# Patient Record
Sex: MALE | Race: BLACK OR AFRICAN AMERICAN | NOT HISPANIC OR LATINO | URBAN - METROPOLITAN AREA
[De-identification: names, ages, dates, MRNs, and addresses within clinical notes are randomized per-mention and may not be internally consistent; named-entity substitution may affect disease eponyms.]

---

## 2017-09-01 ENCOUNTER — OUTPATIENT (OUTPATIENT)
Dept: OUTPATIENT SERVICES | Facility: HOSPITAL | Age: 62
LOS: 1 days | End: 2017-09-01
Payer: MEDICAID

## 2017-09-09 DIAGNOSIS — R69 ILLNESS, UNSPECIFIED: ICD-10-CM

## 2017-10-01 PROCEDURE — G9001: CPT

## 2019-05-01 ENCOUNTER — OUTPATIENT (OUTPATIENT)
Dept: OUTPATIENT SERVICES | Facility: HOSPITAL | Age: 64
LOS: 1 days | End: 2019-05-01
Payer: MEDICAID

## 2019-05-01 PROCEDURE — G9001: CPT

## 2019-05-02 DIAGNOSIS — Z71.89 OTHER SPECIFIED COUNSELING: ICD-10-CM

## 2019-07-01 ENCOUNTER — EMERGENCY (EMERGENCY)
Facility: HOSPITAL | Age: 64
LOS: 1 days | Discharge: HOME | End: 2019-07-01
Attending: EMERGENCY MEDICINE | Admitting: EMERGENCY MEDICINE
Payer: SELF-PAY

## 2019-07-01 VITALS
RESPIRATION RATE: 18 BRPM | DIASTOLIC BLOOD PRESSURE: 74 MMHG | TEMPERATURE: 99 F | OXYGEN SATURATION: 100 % | HEART RATE: 103 BPM | SYSTOLIC BLOOD PRESSURE: 131 MMHG

## 2019-07-01 DIAGNOSIS — R41.82 ALTERED MENTAL STATUS, UNSPECIFIED: ICD-10-CM

## 2019-07-01 DIAGNOSIS — F10.129 ALCOHOL ABUSE WITH INTOXICATION, UNSPECIFIED: ICD-10-CM

## 2019-07-01 DIAGNOSIS — M54.9 DORSALGIA, UNSPECIFIED: ICD-10-CM

## 2019-07-01 PROCEDURE — 99284 EMERGENCY DEPT VISIT MOD MDM: CPT

## 2019-07-01 RX ORDER — IBUPROFEN 200 MG
600 TABLET ORAL ONCE
Refills: 0 | Status: COMPLETED | OUTPATIENT
Start: 2019-07-01 | End: 2019-07-01

## 2019-07-01 RX ADMIN — Medication 600 MILLIGRAM(S): at 19:35

## 2019-07-01 NOTE — ED ADULT NURSE NOTE - NSIMPLEMENTINTERV_GEN_ALL_ED
Implemented All Universal Safety Interventions:  [place] to call system. Call bell, personal items and telephone within reach. Instruct patient to call for assistance. Room bathroom lighting operational. Non-slip footwear when patient is off stretcher. Physically safe environment: no spills, clutter or unnecessary equipment. Stretcher in lowest position, wheels locked, appropriate side rails in place.

## 2019-07-01 NOTE — ED PROVIDER NOTE - OBJECTIVE STATEMENT
64 male, hx of alcohol abuse, hep c, back/neck pain. BIBEMS for alcohol detox facility since he was not cooperating with their evaluation. Pt states he was drinking yesterday and is tired since he has not slept well due to being out on the street. no fall, trauma. States he has some back pain which is similar to prior episodes of back pain. no numbness, no paresthesias. Also requesting food.

## 2019-07-01 NOTE — ED PROVIDER NOTE - CLINICAL SUMMARY MEDICAL DECISION MAKING FREE TEXT BOX
pt seems intoxicated and disheveled but able to answer questions and follow commands. Walking without assistance. Will give motrin for pain. ESTEFANIA.

## 2020-02-01 ENCOUNTER — OUTPATIENT (OUTPATIENT)
Dept: OUTPATIENT SERVICES | Facility: HOSPITAL | Age: 65
LOS: 1 days | End: 2020-02-01
Payer: MEDICARE

## 2020-02-14 ENCOUNTER — INPATIENT (INPATIENT)
Facility: HOSPITAL | Age: 65
LOS: 11 days | Discharge: HOME | DRG: 435 | End: 2020-02-26
Attending: STUDENT IN AN ORGANIZED HEALTH CARE EDUCATION/TRAINING PROGRAM | Admitting: HOSPITALIST
Payer: MEDICARE

## 2020-02-14 VITALS
OXYGEN SATURATION: 97 % | RESPIRATION RATE: 18 BRPM | SYSTOLIC BLOOD PRESSURE: 129 MMHG | DIASTOLIC BLOOD PRESSURE: 73 MMHG | HEART RATE: 82 BPM | HEIGHT: 67 IN | TEMPERATURE: 99 F | WEIGHT: 153.44 LBS

## 2020-02-14 DIAGNOSIS — Z29.9 ENCOUNTER FOR PROPHYLACTIC MEASURES, UNSPECIFIED: ICD-10-CM

## 2020-02-14 DIAGNOSIS — I48.0 PAROXYSMAL ATRIAL FIBRILLATION: ICD-10-CM

## 2020-02-14 DIAGNOSIS — Z87.828 PERSONAL HISTORY OF OTHER (HEALED) PHYSICAL INJURY AND TRAUMA: Chronic | ICD-10-CM

## 2020-02-14 DIAGNOSIS — D53.1 OTHER MEGALOBLASTIC ANEMIAS, NOT ELSEWHERE CLASSIFIED: ICD-10-CM

## 2020-02-14 DIAGNOSIS — K74.60 UNSPECIFIED CIRRHOSIS OF LIVER: ICD-10-CM

## 2020-02-14 DIAGNOSIS — I10 ESSENTIAL (PRIMARY) HYPERTENSION: ICD-10-CM

## 2020-02-14 DIAGNOSIS — K74.69 OTHER CIRRHOSIS OF LIVER: ICD-10-CM

## 2020-02-14 DIAGNOSIS — Z02.9 ENCOUNTER FOR ADMINISTRATIVE EXAMINATIONS, UNSPECIFIED: ICD-10-CM

## 2020-02-14 DIAGNOSIS — R06.09 OTHER FORMS OF DYSPNEA: ICD-10-CM

## 2020-02-14 DIAGNOSIS — F10.10 ALCOHOL ABUSE, UNCOMPLICATED: ICD-10-CM

## 2020-02-14 DIAGNOSIS — R16.0 HEPATOMEGALY, NOT ELSEWHERE CLASSIFIED: ICD-10-CM

## 2020-02-14 LAB
ALBUMIN SERPL ELPH-MCNC: 3 G/DL — LOW (ref 3.3–5)
ALP SERPL-CCNC: 199 U/L — HIGH (ref 40–120)
ALT FLD-CCNC: 58 U/L — HIGH (ref 10–45)
ANION GAP SERPL CALC-SCNC: 15 MMOL/L — SIGNIFICANT CHANGE UP (ref 5–17)
AST SERPL-CCNC: 110 U/L — HIGH (ref 10–40)
BASOPHILS # BLD AUTO: 0 K/UL — SIGNIFICANT CHANGE UP (ref 0–0.2)
BASOPHILS NFR BLD AUTO: 0 % — SIGNIFICANT CHANGE UP (ref 0–2)
BILIRUB SERPL-MCNC: 6.8 MG/DL — HIGH (ref 0.2–1.2)
BUN SERPL-MCNC: 16 MG/DL — SIGNIFICANT CHANGE UP (ref 7–23)
CALCIUM SERPL-MCNC: 9.3 MG/DL — SIGNIFICANT CHANGE UP (ref 8.4–10.5)
CHLORIDE SERPL-SCNC: 104 MMOL/L — SIGNIFICANT CHANGE UP (ref 96–108)
CO2 SERPL-SCNC: 21 MMOL/L — LOW (ref 22–31)
CREAT SERPL-MCNC: 0.75 MG/DL — SIGNIFICANT CHANGE UP (ref 0.5–1.3)
EOSINOPHIL # BLD AUTO: 0.08 K/UL — SIGNIFICANT CHANGE UP (ref 0–0.5)
EOSINOPHIL NFR BLD AUTO: 0.9 % — SIGNIFICANT CHANGE UP (ref 0–6)
GLUCOSE SERPL-MCNC: 104 MG/DL — HIGH (ref 70–99)
HCT VFR BLD CALC: 33.3 % — LOW (ref 39–50)
HGB BLD-MCNC: 10.6 G/DL — LOW (ref 13–17)
LYMPHOCYTES # BLD AUTO: 1.13 K/UL — SIGNIFICANT CHANGE UP (ref 1–3.3)
LYMPHOCYTES # BLD AUTO: 12.3 % — LOW (ref 13–44)
MAGNESIUM SERPL-MCNC: 1.8 MG/DL — SIGNIFICANT CHANGE UP (ref 1.6–2.6)
MCHC RBC-ENTMCNC: 31.8 GM/DL — LOW (ref 32–36)
MCHC RBC-ENTMCNC: 34.6 PG — HIGH (ref 27–34)
MCV RBC AUTO: 108.8 FL — HIGH (ref 80–100)
MONOCYTES # BLD AUTO: 0.88 K/UL — SIGNIFICANT CHANGE UP (ref 0–0.9)
MONOCYTES NFR BLD AUTO: 9.6 % — SIGNIFICANT CHANGE UP (ref 2–14)
NEUTROPHILS # BLD AUTO: 7.1 K/UL — SIGNIFICANT CHANGE UP (ref 1.8–7.4)
NEUTROPHILS NFR BLD AUTO: 74.6 % — SIGNIFICANT CHANGE UP (ref 43–77)
PHOSPHATE SERPL-MCNC: 2.9 MG/DL — SIGNIFICANT CHANGE UP (ref 2.5–4.5)
PLATELET # BLD AUTO: 278 K/UL — SIGNIFICANT CHANGE UP (ref 150–400)
POTASSIUM SERPL-MCNC: 4.4 MMOL/L — SIGNIFICANT CHANGE UP (ref 3.5–5.3)
POTASSIUM SERPL-SCNC: 4.4 MMOL/L — SIGNIFICANT CHANGE UP (ref 3.5–5.3)
PROT SERPL-MCNC: 8.2 G/DL — SIGNIFICANT CHANGE UP (ref 6–8.3)
RBC # BLD: 3.06 M/UL — LOW (ref 4.2–5.8)
RBC # FLD: 14.6 % — HIGH (ref 10.3–14.5)
SODIUM SERPL-SCNC: 140 MMOL/L — SIGNIFICANT CHANGE UP (ref 135–145)
WBC # BLD: 9.2 K/UL — SIGNIFICANT CHANGE UP (ref 3.8–10.5)
WBC # FLD AUTO: 9.2 K/UL — SIGNIFICANT CHANGE UP (ref 3.8–10.5)

## 2020-02-14 PROCEDURE — 99223 1ST HOSP IP/OBS HIGH 75: CPT | Mod: GC

## 2020-02-14 PROCEDURE — 93010 ELECTROCARDIOGRAM REPORT: CPT

## 2020-02-14 RX ORDER — METOPROLOL TARTRATE 50 MG
25 TABLET ORAL
Refills: 0 | Status: DISCONTINUED | OUTPATIENT
Start: 2020-02-14 | End: 2020-02-26

## 2020-02-14 RX ORDER — LANOLIN ALCOHOL/MO/W.PET/CERES
6 CREAM (GRAM) TOPICAL AT BEDTIME
Refills: 0 | Status: DISCONTINUED | OUTPATIENT
Start: 2020-02-14 | End: 2020-02-26

## 2020-02-14 RX ORDER — METOPROLOL TARTRATE 50 MG
1 TABLET ORAL
Qty: 0 | Refills: 0 | DISCHARGE

## 2020-02-14 RX ORDER — POLYETHYLENE GLYCOL 3350 17 G/17G
17 POWDER, FOR SOLUTION ORAL DAILY
Refills: 0 | Status: DISCONTINUED | OUTPATIENT
Start: 2020-02-14 | End: 2020-02-17

## 2020-02-14 RX ORDER — THIAMINE MONONITRATE (VIT B1) 100 MG
100 TABLET ORAL DAILY
Refills: 0 | Status: DISCONTINUED | OUTPATIENT
Start: 2020-02-14 | End: 2020-02-26

## 2020-02-14 RX ORDER — POLYETHYLENE GLYCOL 3350 17 G/17G
17 POWDER, FOR SOLUTION ORAL
Qty: 0 | Refills: 0 | DISCHARGE

## 2020-02-14 RX ORDER — PANTOPRAZOLE SODIUM 20 MG/1
40 TABLET, DELAYED RELEASE ORAL
Refills: 0 | Status: DISCONTINUED | OUTPATIENT
Start: 2020-02-14 | End: 2020-02-26

## 2020-02-14 RX ORDER — MIRTAZAPINE 45 MG/1
15 TABLET, ORALLY DISINTEGRATING ORAL AT BEDTIME
Refills: 0 | Status: DISCONTINUED | OUTPATIENT
Start: 2020-02-14 | End: 2020-02-26

## 2020-02-14 RX ORDER — FOLIC ACID 0.8 MG
1 TABLET ORAL
Qty: 0 | Refills: 0 | DISCHARGE

## 2020-02-14 RX ORDER — FOLIC ACID 0.8 MG
1 TABLET ORAL DAILY
Refills: 0 | Status: DISCONTINUED | OUTPATIENT
Start: 2020-02-14 | End: 2020-02-26

## 2020-02-14 RX ORDER — ESOMEPRAZOLE MAGNESIUM 40 MG/1
1 CAPSULE, DELAYED RELEASE ORAL
Qty: 0 | Refills: 0 | DISCHARGE

## 2020-02-14 RX ORDER — MIRTAZAPINE 45 MG/1
1 TABLET, ORALLY DISINTEGRATING ORAL
Qty: 0 | Refills: 0 | DISCHARGE

## 2020-02-14 RX ORDER — THIAMINE MONONITRATE (VIT B1) 100 MG
1 TABLET ORAL
Qty: 0 | Refills: 0 | DISCHARGE

## 2020-02-14 RX ADMIN — Medication 6 MILLIGRAM(S): at 23:01

## 2020-02-14 RX ADMIN — MIRTAZAPINE 15 MILLIGRAM(S): 45 TABLET, ORALLY DISINTEGRATING ORAL at 23:02

## 2020-02-14 NOTE — H&P ADULT - NSHPREVIEWOFSYSTEMS_GEN_ALL_CORE
REVIEW OF SYSTEMS:    CONSTITUTIONAL: generalized weakness, no fevers or chills  EYES/ENT: No visual changes;  No vertigo or throat pain   NECK: No pain or stiffness  RESPIRATORY: No cough, wheezing, hemoptysis; No shortness of breath  CARDIOVASCULAR: No chest pain or palpitations, + dyspnea  GASTROINTESTINAL: mild abdominal pain. No nausea, vomiting, or hematemesis; No diarrhea or constipation. No melena or hematochezia.  GENITOURINARY: No dysuria, frequency or hematuria  NEUROLOGICAL: No numbness or weakness  SKIN: No itching, rashes REVIEW OF SYSTEMS:    CONSTITUTIONAL: generalized weakness, no fevers or chills  EYES/ENT: No visual changes;  No vertigo or throat pain   NECK: No pain or stiffness  RESPIRATORY: No cough, wheezing, hemoptysis; No shortness of breath  CARDIOVASCULAR: No chest pain or palpitations, + dyspnea  GASTROINTESTINAL: mild abdominal pain. No nausea, vomiting, or hematemesis; No diarrhea or constipation. No melena or hematochezia.  GENITOURINARY: No dysuria, frequency or hematuria  NEUROLOGICAL: No numbness or weakness  SKIN: No itching, rashes  Psych: No depression No anxiety

## 2020-02-14 NOTE — H&P ADULT - ASSESSMENT
The patient is a 65 year old non-domisciled man with a history if cirrhosis secondary to alcohol and drug abuse, subdural hemorrhage, afib (not on AC) is transferred from [facility] for further work up of hepatic masses.

## 2020-02-14 NOTE — H&P ADULT - PROBLEM SELECTOR PLAN 4
- recorded history of afib. EKG shows NSR.   - call [facility] in AM to discuss history of afib.  - continue metoprolol 25 BID - patient 10.6 with  likely from liver disfunction however has alcohol abuse   - will check b12 folate

## 2020-02-14 NOTE — H&P ADULT - HISTORY OF PRESENT ILLNESS
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 for further work up of hepatic masses. The patient was admitted to [facility] with  diffuse abdominal pain and tremulousness. He had a sepsis work-up which was negative and completed a CIWA taper. He had a repeat CT A/P with IV contrast which redemonstrated mutliple liver lesions. He had an extensive work-up for his cirrhosis and liver lesions in 05/2019 at [facility] and refused biopsy at the time but agreed to biopsy during this hospitalization. They attempted CT guided liver biopsy however the patient was moving around too much and the procedure was aborted. The patient was transferred to [facility] for further hepatology work up.     The patient was seen and examined at bedside. He currently feels weak, and has minimal abdominal pain mainly in the LLQ. He has no nausea no vomiting, no diarrhea, no chest pain. In the past the patietn has had shortness of breath and lower extremity edema but he has not had that since he has been in the hospital. The patient is a 65 year old non-domisciled man with a history if cirrhosis secondary to alcohol and drug abuse, subdural hemorrhage, afib (not on AC) is transferred from [facility] for further work up of hepatic masses. The patient was admitted to [facility] with diffuse abdominal pain and tremulousness. He had a sepsis work-up which was negative and completed a CIWA ativan taper. He had a repeat CT A/P with IV contrast which redemonstrated mutliple liver lesions. He had an extensive work-up for his cirrhosis and liver lesions in 05/2019 at [facility] and refused biopsy at the time but agreed to biopsy during this hospitalization. They attempted CT guided liver biopsy however the patient was moving around too much and the procedure was aborted. The patient was transferred to [facility] for further hepatology work up.     The patient was seen and examined at bedside. He currently feels weak, and has minimal abdominal pain mainly in the LLQ. He has no nausea no vomiting, no diarrhea, no chest pain. In the past the patietn has had shortness of breath and lower extremity edema but he has not had that since he has been in the hospital.

## 2020-02-14 NOTE — H&P ADULT - NSHPLABSRESULTS_GEN_ALL_CORE
10.6   9.20  )-----------( 278      ( 14 Feb 2020 21:43 )             33.3       02-14    140  |  104  |  16  ----------------------------<  104<H>  4.4   |  21<L>  |  0.75    Ca    9.3      14 Feb 2020 21:43  Phos  2.9     02-14  Mg     1.8     02-14    TPro  8.2  /  Alb  3.0<L>  /  TBili  6.8<H>  /  DBili  x   /  AST  110<H>  /  ALT  58<H>  /  AlkPhos  199<H>  02-14    Hepatitis labs from [facility]:   Hep A AB-neg  Hep B Core IgG: pos  Hep C AB : pos    AFP at [facility]: 34.7  CEA at [facility]: 6.52    EKG as reviewed by me: CHRISTINE    CT triple phase at [facility] 2/11: Cirrhotic liver with multiple masses suspicious for multifocal HCC Personally reviewed imaging, labs.    10.6   9.20  )-----------( 278      ( 14 Feb 2020 21:43 )             33.3       02-14    140  |  104  |  16  ----------------------------<  104<H>  4.4   |  21<L>  |  0.75    Ca    9.3      14 Feb 2020 21:43  Phos  2.9     02-14  Mg     1.8     02-14    TPro  8.2  /  Alb  3.0<L>  /  TBili  6.8<H>  /  DBili  x   /  AST  110<H>  /  ALT  58<H>  /  AlkPhos  199<H>  02-14    Hepatitis labs from [facility]:   Hep A AB-neg  Hep B Core IgG: pos  Hep C AB : pos    AFP at [facility]: 34.7  CEA at [facility]: 6.52    EKG as reviewed by me: CHRISTINE    CT triple phase at [facility] 2/11: Cirrhotic liver with multiple masses suspicious for multifocal HCC

## 2020-02-14 NOTE — H&P ADULT - NSHPREVIEWOFSYSTEMS_GEN_ALL_CORE
Gen: no night sweats or change in appetite  Eyes: no changes in vision or diplopia   ENT: no epistaxis, sinus pain, gingival bleeding, odynophagia or dysphagia  CV: no CP, PND or palpitations  Resp: no cough, wheezing, or hemoptysis  GI: no hematemesis, hematochezia, or melena  : no dysuria, polyuria, or hematuria  MSK: no arthralgias or joint swelling   Neuro: no gross sensory changes, numbness, focal deficits  Psych: no depression or changes in concentration  Heme/Onc: no purpura, petechiae or night sweats  Skin: no pruritus, hair loss or skin lesions  All: no photosensitivity, no complaints of anaphylaxis (SOB, throat swelling)

## 2020-02-14 NOTE — H&P ADULT - NSHPSOCIALHISTORY_GEN_ALL_CORE
The patient is homeless, his niece is his next of kin and helps him make medical decisions. He doesn't smoke. He recently stopped using crack, cocaine, and heroine. He used to be a drug dealer. His last drink was before his admission of [facility]. he drinks about a pint of liquor and 5 beers daily. The patient is homeless, his niece is his next of kin and helps him make medical decisions. He doesn't smoke. He recently stopped using crack, cocaine, and heroine. He used to be a drug dealer. His last drink was before his admission of [facility]. he normally drinks about a pint of liquor and 5 beers daily.

## 2020-02-14 NOTE — H&P ADULT - PROBLEM SELECTOR PLAN 3
- patient has history of alcohol abuse last drink 2/5/19 out of window for delirium tremens.   - s/p CIWA taper at [facility].   - monitor for signs of withdrawal. - patient has history of alcohol abuse last drink 2/5/20 out of window for delirium tremens.   -  completed ativan CIWA taper at [facility].   - monitor for signs of withdrawal.  - There is no signs of withdrawal on physical exam at this time. - patient has history of alcohol abuse last drink 2/5/20 out of window for delirium tremens.   -  completed ativan CIWA taper at [facility].   - monitor for signs of withdrawal.  - There is no signs of withdrawal on physical exam at this time.  - cont thiamine, mvt

## 2020-02-14 NOTE — H&P ADULT - PROBLEM SELECTOR PLAN 8
Transitions of Care Status:  1.  Name of PCP:  2.  PCP Contacted on Admission: [ ] Y    [ x] N    3.  PCP contacted at Discharge: [ ] Y    [ ] N    [ ] N/A  4.  Post-Discharge Appointment Date and Location:  5.  Summary of Handoff given to PCP: DVT prophylaxis: lovenox (SDH was year ago, CT from [facility] from january is stable).    Social work consult  PT consult DVT prophylaxis: lovenox (SDH was year ago, CT from [facility] from January is stable).    Social work consult  PT consult
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
## 2020-02-14 NOTE — H&P ADULT - PROBLEM SELECTOR PLAN 7
DVT prophylaxis: lovenox (SDH was year ago, CT from [facility] from january is stable).    Social work consult  PT consult - patient says he has had dyspnea on exertion in the past with some lower extremity edema that is not present on exam now.   - no history of HF.   - EKG shows no current signs of ischemia.   - f/u TTE

## 2020-02-14 NOTE — H&P ADULT - PROBLEM SELECTOR PLAN 1
- The patient has a history of cirrhosis without current encephalopathy. recently found to have rising bilirubin at [facility].   - extensive work-up was done at [facility] regarding the etiology. likely multifactorial in the setting of hep b/c infections as well as alcohol abuse.   - consult hepatology in AM regarding necessity for further work up. - The patient has a history of cirrhosis without current encephalopathy. recently found to have rising bilirubin at [facility].   - extensive work-up was done at [facility] regarding the etiology. likely multifactorial in the setting of hep b/c infections as well as alcohol abuse.   - consult hepatology in AM regarding necessity for further work up.  - Consult IR  - get CEA AFP, hepatology serologies in AM.   - calculate meld in AM - The patient has a history of cirrhosis without current encephalopathy. recently found to have rising bilirubin at [facility].   - extensive work-up was done at [facility] regarding the etiology. likely multifactorial in the setting of hep b/c infections as well as alcohol abuse.   - consult hepatology in AM regarding necessity for further work up.  - Consult IR for Biopsy  - get CEA AFP, hepatology serologies in AM.   - calculate meld in AM  - patient with protein gap likely secondary to liver dysfunction however cannot rule out other malignancy, get LDH in AM and check for bony lesions on imaging. - The patient has a history of cirrhosis without current encephalopathy. recently found to have rising bilirubin at [facility].   - extensive work-up was done at [facility] regarding the etiology. likely multifactorial in the setting of hep b/c infections as well as alcohol abuse.   - consult hepatology in AM regarding poss further work up.  - Consult IR for Biopsy  - get CEA AFP, hepatology serologies in AM.   - calculate meld in AM  - patient with protein gap of unknown etiol

## 2020-02-14 NOTE — H&P ADULT - NSHPPHYSICALEXAM_GEN_ALL_CORE
T(C): 37.2 (02-14-20 @ 18:35), Max: 37.2 (02-14-20 @ 18:35)  HR: 82 (02-14-20 @ 18:35) (82 - 82)  BP: 129/73 (02-14-20 @ 18:35) (129/73 - 129/73)  RR: 18 (02-14-20 @ 18:35) (18 - 18)  SpO2: 97% (02-14-20 @ 18:35) (97% - 97%)    Gen: (fe)male in NAD, appears comfortable, no diaphoresis  HEENT: NCAT, MMM, neck soft and supple  CV: +S1/S2, no m/r/g  Resp: CTAB, no w/r/r  GI: normoactive BS, soft, NTND, no rebounding/guarding  Ext: No LE edema, extremities appear warm and well perfused   Neuro: AOx3, no focal deficits, CNII-XII grossly intact  Psych: No SI/HI/AVH, appropriate affect  Skin: no petechiae, ecchymosis or maculopapular rash noted

## 2020-02-14 NOTE — H&P ADULT - PROBLEM SELECTOR PLAN 2
- patient found to have multiple liver masses this past May with concern for metastatic disease vs. Multifocal HCC.   - patient has history of Hepatitis b and C based on documentation in [facility].   - attempted biopsy at [facility] was aborted. consider attempting again. - patient found to have multiple liver masses this past May with concern for metastatic disease vs. Multifocal HCC.   - patient has history of Hepatitis b and C based on documentation in [facility].   - attempted biopsy at [facility] was aborted. would call IR in AM

## 2020-02-14 NOTE — H&P ADULT - PROBLEM SELECTOR PLAN 6
- patient says he has had dyspnea on exertion in the past with some lower extremity edema that is not present on exam now.   - no history of HF.   - EKG shows no current signs of ischemia.   - f/u TTE - patient has history of HTN on atenolol  - monitor BP consider switching to different medication for BP management given that atenolol is not first line for HTN Tx

## 2020-02-14 NOTE — H&P ADULT - NSICDXPASTMEDICALHX_GEN_ALL_CORE_FT
PAST MEDICAL HISTORY:  Alcohol abuse     Drug abuse heroine, crack, and cocaine, stopped several months ago    Hypertension     Liver cirrhosis     Paroxysmal atrial fibrillation

## 2020-02-14 NOTE — H&P ADULT - PROBLEM SELECTOR PLAN 5
- patient has history of HTN on atenolol  - monitor BP consider switching to different medication for BP management given that atenolol is not first line for HTN Tx - recorded history of afib. EKG shows NSR.   - call [facility] in AM to discuss history of afib.  - continue metoprolol 25 BID

## 2020-02-15 LAB
AFP-TM SERPL-MCNC: 36.8 NG/ML — HIGH
ALBUMIN SERPL ELPH-MCNC: 2.9 G/DL — LOW (ref 3.3–5)
ALP SERPL-CCNC: 189 U/L — HIGH (ref 40–120)
ALT FLD-CCNC: 56 U/L — HIGH (ref 10–45)
AMMONIA BLD-MCNC: 45 UMOL/L — SIGNIFICANT CHANGE UP (ref 11–55)
ANION GAP SERPL CALC-SCNC: 14 MMOL/L — SIGNIFICANT CHANGE UP (ref 5–17)
APTT BLD: 34.1 SEC — SIGNIFICANT CHANGE UP (ref 27.5–36.3)
AST SERPL-CCNC: 103 U/L — HIGH (ref 10–40)
BASOPHILS # BLD AUTO: 0.04 K/UL — SIGNIFICANT CHANGE UP (ref 0–0.2)
BASOPHILS NFR BLD AUTO: 0.5 % — SIGNIFICANT CHANGE UP (ref 0–2)
BILIRUB DIRECT SERPL-MCNC: 3.5 MG/DL — HIGH (ref 0–0.2)
BILIRUB SERPL-MCNC: 6.6 MG/DL — HIGH (ref 0.2–1.2)
BUN SERPL-MCNC: 17 MG/DL — SIGNIFICANT CHANGE UP (ref 7–23)
CALCIUM SERPL-MCNC: 9.1 MG/DL — SIGNIFICANT CHANGE UP (ref 8.4–10.5)
CEA SERPL-MCNC: 6.4 NG/ML — HIGH (ref 0–3.8)
CHLORIDE SERPL-SCNC: 102 MMOL/L — SIGNIFICANT CHANGE UP (ref 96–108)
CHOLEST SERPL-MCNC: 136 MG/DL — SIGNIFICANT CHANGE UP (ref 10–199)
CO2 SERPL-SCNC: 21 MMOL/L — LOW (ref 22–31)
CREAT SERPL-MCNC: 0.68 MG/DL — SIGNIFICANT CHANGE UP (ref 0.5–1.3)
EOSINOPHIL # BLD AUTO: 0.17 K/UL — SIGNIFICANT CHANGE UP (ref 0–0.5)
EOSINOPHIL NFR BLD AUTO: 1.9 % — SIGNIFICANT CHANGE UP (ref 0–6)
FERRITIN SERPL-MCNC: 454 NG/ML — HIGH (ref 30–400)
FOLATE SERPL-MCNC: 12.5 NG/ML — SIGNIFICANT CHANGE UP
GLUCOSE SERPL-MCNC: 105 MG/DL — HIGH (ref 70–99)
HBV CORE AB SER-ACNC: SIGNIFICANT CHANGE UP
HBV SURFACE AB SER-ACNC: REACTIVE
HBV SURFACE AG SER-ACNC: SIGNIFICANT CHANGE UP
HCT VFR BLD CALC: 32.1 % — LOW (ref 39–50)
HCV AB S/CO SERPL IA: 11.45 S/CO — HIGH (ref 0–0.99)
HCV AB S/CO SERPL IA: 12 S/CO — HIGH (ref 0–0.99)
HCV AB SERPL-IMP: REACTIVE
HCV AB SERPL-IMP: REACTIVE
HDLC SERPL-MCNC: 15 MG/DL — LOW
HGB BLD-MCNC: 10.3 G/DL — LOW (ref 13–17)
HIV 1+2 AB+HIV1 P24 AG SERPL QL IA: SIGNIFICANT CHANGE UP
IMM GRANULOCYTES NFR BLD AUTO: 0.5 % — SIGNIFICANT CHANGE UP (ref 0–1.5)
INR BLD: 1.5 RATIO — HIGH (ref 0.88–1.16)
IRON SATN MFR SERPL: 35 % — SIGNIFICANT CHANGE UP (ref 16–55)
IRON SATN MFR SERPL: 63 UG/DL — SIGNIFICANT CHANGE UP (ref 45–165)
LDH SERPL L TO P-CCNC: 140 U/L — SIGNIFICANT CHANGE UP (ref 50–242)
LIPID PNL WITH DIRECT LDL SERPL: 97 MG/DL — SIGNIFICANT CHANGE UP
LYMPHOCYTES # BLD AUTO: 1.09 K/UL — SIGNIFICANT CHANGE UP (ref 1–3.3)
LYMPHOCYTES # BLD AUTO: 12.3 % — LOW (ref 13–44)
MAGNESIUM SERPL-MCNC: 1.7 MG/DL — SIGNIFICANT CHANGE UP (ref 1.6–2.6)
MCHC RBC-ENTMCNC: 32.1 GM/DL — SIGNIFICANT CHANGE UP (ref 32–36)
MCHC RBC-ENTMCNC: 34.6 PG — HIGH (ref 27–34)
MCV RBC AUTO: 107.7 FL — HIGH (ref 80–100)
MONOCYTES # BLD AUTO: 0.8 K/UL — SIGNIFICANT CHANGE UP (ref 0–0.9)
MONOCYTES NFR BLD AUTO: 9 % — SIGNIFICANT CHANGE UP (ref 2–14)
NEUTROPHILS # BLD AUTO: 6.71 K/UL — SIGNIFICANT CHANGE UP (ref 1.8–7.4)
NEUTROPHILS NFR BLD AUTO: 75.8 % — SIGNIFICANT CHANGE UP (ref 43–77)
NRBC # BLD: 0 /100 WBCS — SIGNIFICANT CHANGE UP (ref 0–0)
PHOSPHATE SERPL-MCNC: 2.9 MG/DL — SIGNIFICANT CHANGE UP (ref 2.5–4.5)
PLATELET # BLD AUTO: 308 K/UL — SIGNIFICANT CHANGE UP (ref 150–400)
POTASSIUM SERPL-MCNC: 3.7 MMOL/L — SIGNIFICANT CHANGE UP (ref 3.5–5.3)
POTASSIUM SERPL-SCNC: 3.7 MMOL/L — SIGNIFICANT CHANGE UP (ref 3.5–5.3)
PROT SERPL-MCNC: 8 G/DL — SIGNIFICANT CHANGE UP (ref 6–8.3)
PROTHROM AB SERPL-ACNC: 17.3 SEC — HIGH (ref 10–13.1)
RBC # BLD: 2.98 M/UL — LOW (ref 4.2–5.8)
RBC # FLD: 14.6 % — HIGH (ref 10.3–14.5)
SODIUM SERPL-SCNC: 137 MMOL/L — SIGNIFICANT CHANGE UP (ref 135–145)
TIBC SERPL-MCNC: 179 UG/DL — LOW (ref 220–430)
TOTAL CHOLESTEROL/HDL RATIO MEASUREMENT: 9 RATIO — SIGNIFICANT CHANGE UP (ref 3.4–9.6)
TRIGL SERPL-MCNC: 124 MG/DL — SIGNIFICANT CHANGE UP (ref 10–149)
TSH SERPL-MCNC: 1.05 UIU/ML — SIGNIFICANT CHANGE UP (ref 0.27–4.2)
UIBC SERPL-MCNC: 117 UG/DL — SIGNIFICANT CHANGE UP (ref 110–370)
VIT B12 SERPL-MCNC: 802 PG/ML — SIGNIFICANT CHANGE UP (ref 232–1245)
WBC # BLD: 8.85 K/UL — SIGNIFICANT CHANGE UP (ref 3.8–10.5)
WBC # FLD AUTO: 8.85 K/UL — SIGNIFICANT CHANGE UP (ref 3.8–10.5)

## 2020-02-15 PROCEDURE — 99233 SBSQ HOSP IP/OBS HIGH 50: CPT

## 2020-02-15 RX ORDER — ENOXAPARIN SODIUM 100 MG/ML
40 INJECTION SUBCUTANEOUS DAILY
Refills: 0 | Status: DISCONTINUED | OUTPATIENT
Start: 2020-02-15 | End: 2020-02-26

## 2020-02-15 RX ADMIN — PANTOPRAZOLE SODIUM 40 MILLIGRAM(S): 20 TABLET, DELAYED RELEASE ORAL at 05:19

## 2020-02-15 RX ADMIN — Medication 25 MILLIGRAM(S): at 17:26

## 2020-02-15 RX ADMIN — Medication 100 MILLIGRAM(S): at 13:47

## 2020-02-15 RX ADMIN — Medication 25 MILLIGRAM(S): at 05:19

## 2020-02-15 RX ADMIN — Medication 1 TABLET(S): at 13:47

## 2020-02-15 RX ADMIN — Medication 1 MILLIGRAM(S): at 13:47

## 2020-02-15 RX ADMIN — Medication 6 MILLIGRAM(S): at 21:33

## 2020-02-15 RX ADMIN — MIRTAZAPINE 15 MILLIGRAM(S): 45 TABLET, ORALLY DISINTEGRATING ORAL at 21:33

## 2020-02-15 RX ADMIN — ENOXAPARIN SODIUM 40 MILLIGRAM(S): 100 INJECTION SUBCUTANEOUS at 13:47

## 2020-02-15 NOTE — PROGRESS NOTE ADULT - PROBLEM SELECTOR PLAN 3
- patient has history of alcohol abuse last drink 2/5/20 out of window for delirium tremens.   -  completed ativan CIWA taper at [facility].   - cont thiamine, mvt

## 2020-02-15 NOTE — PROGRESS NOTE ADULT - PROBLEM SELECTOR PLAN 5
- recorded history of afib. EKG shows NSR.   - not on AC due to SDH in the past  - continue metoprolol 25 BID

## 2020-02-15 NOTE — CONSULT NOTE ADULT - ASSESSMENT
65 year old man with medical history of A fib not on anticoagulation, h/o subdural hematoma, Alcohol abuse , Cirrhosis, hepatitis C presented to [facility] as a transfer from [facility] for further management of his liver mass.  Records reviewed.   On 7/2014 CT showed 1st vertebral body compression fracture. CT abdomen: hypodense lesion 6.5 cm in right hepatic lobe.   07/2014 Hep C serology positive   07.2014 AFP: 23. CEA: 6,18    05/2019:  CT abdomen: Right hepatic lobe lesion 5.3 x 5.7 x 6.1 cm and multiple other lesions. Lymphadenopathy  05/2019 Colonoscopy and EGD: colonic diverticulosis. No varices reported.    02/2020 Admitted to [facility] for abdominal pain. CT abdomen : numerous hepatic masses. Largest 5.5x 10.9 right lobe.   Ethanol level: 236. CT guided biopsy planned on 02/07 but failed due to technical difficulty.   Urine tox positive for cocaine   CT abdomen 2/11 triple phase: cirrhotic liver with multiple masses suspicious for hepatocellular carcinoma  AFP: 34.7   CEA: 6.52  Hepatitis B core IgG and Hepatitis C Ab are reactive.    Impression:  # Elevated liver enzymes : likely due to Alcoholic hepatitis ( DF score: 26), in the setting of cirrhotic liver and possible HCC.  # Liver lesions and elevated AFP suspicious for HCC  # Hep C vs Alcohol induced_Cirrhosis, MELD-Na _ 20 ( 02/15/2020)        - varices: EGD 05/2019 EGD report is not available - but no documentation of varices in the outside hospital.         - ascites: yes on exam        - SPE: not on exam        - HCC: multiple lesions suspicious for HCC    # Alcohol and cocaine abuse   # A fib    Recommendations:  - Pleas have the CT scan films uploaded and interpret by our radiology     - Repeat AFP  - trend CBC, INR, CMP   - Obtain hepatitis C PCR- RNA, Hepatitis B PCR- DNA  - obtain HBsAb, HBsAg, HBcAb, HCV Ab, HAV IgG/IgM to r/o viral hepatitides as cause of cirrhosis  - obtain CHRISTIE, ASMA, LKM Ab, quant IgM, IgA, Ig G AMA to r/o autoimmune disease  - obtain alpha-1-antityrypsin, iron panel and ferritin, ceruloplasmin  - avoid all hepatotoxic agents  - encourage EtOH cessation  - Nutrition support 65 year old man with medical history of A fib not on anticoagulation, h/o subdural hematoma, Alcohol abuse , Cirrhosis, hepatitis C presented to [facility] as a transfer from [facility] for further management of his liver mass.  Records reviewed.   On 7/2014 CT showed 1st vertebral body compression fracture. CT abdomen: hypodense lesion 6.5 cm in right hepatic lobe.   07/2014 Hep C serology positive   07.2014 AFP: 23. CEA: 6,18    05/2019:  CT abdomen: Right hepatic lobe lesion 5.3 x 5.7 x 6.1 cm and multiple other lesions. Lymphadenopathy  05/2019 Colonoscopy and EGD: colonic diverticulosis. No varices reported.    02/2020 Admitted to [facility] for abdominal pain. CT abdomen : numerous hepatic masses. Largest 5.5x 10.9 right lobe.   Ethanol level: 236. CT guided biopsy planned on 02/07 but failed due to technical difficulty.   Urine tox positive for cocaine   CT abdomen 2/11 triple phase: cirrhotic liver with multiple masses suspicious for hepatocellular carcinoma  AFP: 34.7   CEA: 6.52  Hepatitis B core IgG and Hepatitis C Ab are reactive.    Impression:  # Elevated liver enzymes : likely due to Alcoholic hepatitis ( DF score: 26), in the setting of cirrhotic liver and possible HCC.  # Liver lesions and elevated AFP suspicious for HCC  # Hep C vs Alcohol induced_Cirrhosis, MELD-Na _ 20 ( 02/15/2020)        - varices: EGD 05/2019 EGD report is not available - but no documentation of varices in the outside hospital.         - ascites: yes on exam        - SPE: not on exam        - HCC: multiple lesions suspicious for HCC    # Alcohol and cocaine abuse   # A fib    Recommendations:  - Pleas have the CT scan films uploaded and interpreted by our radiology     - trend CBC, INR, CMP   - Obtain hepatitis C PCR- RNA, Hepatitis B PCR- DNA  - obtain HBsAb, HBsAg, HBcAb, HCV Ab, HAV IgG/IgM to r/o viral hepatitides as cause of cirrhosis  - obtain CHRISTIE, ASMA, LKM Ab, quant IgM, IgA, Ig G AMA to r/o autoimmune disease  - obtain alpha-1-antityrypsin, iron panel and ferritin, ceruloplasmin  - avoid all hepatotoxic agents  - encourage EtOH cessation  - Nutrition support

## 2020-02-15 NOTE — PHYSICAL THERAPY INITIAL EVALUATION ADULT - GENERAL OBSERVATIONS, REHAB EVAL
Pt received Pt received supine in bed in NAD, blanket over head, +IVL, VSS, uncooperative not willing to participate in OOB activity at this time.

## 2020-02-15 NOTE — PHYSICAL THERAPY INITIAL EVALUATION ADULT - DIAGNOSIS, PT EVAL
Decreased strength, endurance, balance, cognition all impacting ability to perform ADLs and functional mobility.
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
## 2020-02-15 NOTE — PHYSICAL THERAPY INITIAL EVALUATION ADULT - DISCHARGE DISPOSITION, PT EVAL
TBD pending completion of functional evaluation and further hospital course Pt stated he has no place to go, Pt will appropriate placement w/ assistance and require PT to improve strength, balance and to maximize his functional independence./home w/ assist Pt stated he has no place to go, Pt will require appropriate placement w/ assistance (homeless shelter Vs rehab) and will benefit from PT to improve his strength, balance and to maximize his functional independence./home w/ assist
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
## 2020-02-15 NOTE — PROGRESS NOTE ADULT - PROBLEM SELECTOR PLAN 2
- patient found to have multiple liver masses this past May with concern for metastatic disease vs. Multifocal HCC.   - AFP 36.8  -CEA 6.4  - hepatology consult pending

## 2020-02-15 NOTE — PHYSICAL THERAPY INITIAL EVALUATION ADULT - PERTINENT HX OF CURRENT PROBLEM, REHAB EVAL
64 yo M with PMHx of A fib not on anticoagulation, h/o subdural hematoma, alcohol abuse, Cirrhosis, hepatitis C presented to [facility] as a transfer from [facility] for further management of his hepatic mass. SEE BELOW:

## 2020-02-15 NOTE — PHYSICAL THERAPY INITIAL EVALUATION ADULT - BALANCE TRAINING, PT EVAL
GOAL: Pt will improve static/dynamic sitting and standing balance by at least 1/2 grade to decrease fall risk during functional mobility and promote independence with ADLs within 3-4 wks.

## 2020-02-15 NOTE — PROGRESS NOTE ADULT - PROBLEM SELECTOR PLAN 8
DVT prophylaxis: lovenox (SDH was year ago, CT from [facility] from January is stable).    Social work consult  PT consult

## 2020-02-15 NOTE — PHYSICAL THERAPY INITIAL EVALUATION ADULT - STRENGTHENING, PT EVAL
GOAL: Pt will increase strength x4 extremities to improve ease with functional mobility within 3-4 wks.

## 2020-02-15 NOTE — PROGRESS NOTE ADULT - PROBLEM SELECTOR PLAN 1
- The patient has a history of cirrhosis without current encephalopathy. recently found to have rising bilirubin at [facility].   - extensive work-up was done at [facility] regarding the etiology. likely multifactorial in the setting of hep b/c infections as well as alcohol abuse.   - hepatology consult pending

## 2020-02-15 NOTE — PHYSICAL THERAPY INITIAL EVALUATION ADULT - LEVEL OF CONSCIOUSNESS, REHAB EVAL
agitated/alert/Pt reports feeling fatigued, uncooperative, not willing to perform OOB activity at this time due to lethargy and weakness.

## 2020-02-15 NOTE — PROGRESS NOTE ADULT - PROBLEM SELECTOR PLAN 7
- patient says he has had dyspnea on exertion in the past with some lower extremity edema that is not present on exam now.   - no history of HF.   - EKG shows no current signs of ischemia.   - f/u TTE

## 2020-02-15 NOTE — PROGRESS NOTE ADULT - PROBLEM SELECTOR PLAN 4
- patient 10.6 with  likely from liver disfunction however has alcohol abuse   - folate and b12 normal

## 2020-02-15 NOTE — PHYSICAL THERAPY INITIAL EVALUATION ADULT - PRECAUTIONS/LIMITATIONS, REHAB EVAL
fall precautions/CONTINUED: 02/2020 Admitted to [facility] for abdominal pain. CT abdomen : numerous hepatic masses. Largest 5.5x 10.9 right lobe.

## 2020-02-15 NOTE — PHYSICAL THERAPY INITIAL EVALUATION ADULT - ADDITIONAL COMMENTS
Per patient, resides in private home with spouse, 15 steps to enter +BHR assist, no stairs to negotiate inside the home. Pt states he was ambulating independently prior to admit without AD for household distances with RW for community ambulation. Pt reports performing all ADLs independently, (+) working for a [employer] company, (-) driving. Per Inpatient Documents via patient profile patient is homeless. Per patient, he lived in private home with spouse, 15 steps to enter +BHR assist, no stairs to negotiate inside the home. Pt states he was ambulating independently prior to admit without AD for household distances with RW for community ambulation. Pt reports performing all ADLs independently, (+) working for a [employer] company, (-) driving. Per Inpatient Documents via patient profile patient is homeless.

## 2020-02-15 NOTE — PHYSICAL THERAPY INITIAL EVALUATION ADULT - LEVEL OF INDEPENDENCE: SIT/STAND, REHAB EVAL
Pt adamantly refusing OOB activity at this time, appears agitated reports feeling fatigued and weak reports ambulating this morning to the bathroom stand-by assist/contact guard

## 2020-02-15 NOTE — PHYSICAL THERAPY INITIAL EVALUATION ADULT - THERAPY FREQUENCY, PT EVAL
Attending Note       The Resident's history was reviewed and patient interviewed.    The History is confirmed      Brief history: Pt has notes blood in toilet after urination 3 times today. Blood also noted on underwear. No dysuria, urgency or frequency. Bleeding is not vaginal. Pt also has a hx of hemorrhoids and feels bleeding is from this. No abdominal , rectal or back pain.      The Resident's physical exam was reviewed and patient examined.    Physical confirmed      Brief Physical: No abd discomfort to palpation.      The Assessment and plan were reviewed with the resident.      I confirm the clinical impression.    I have reviewed the residents care plan and agree with the planned approach.      Vitals  Vitals:    08/02/19 1126 08/02/19 1130 08/02/19 1200 08/02/19 1204   BP: 113/61 114/63 134/69 126/71   Pulse: 51 51 54 55   Resp: 19 20 19 19   Temp:       TempSrc:       SpO2: 98% 98% 99% 96%   Weight:       Height:           ED Course  Rectal exam with evidence of recently bleeding hemorrhoid. UA / labs.    [facility]  Critical Care time spent on this patient outside of billable procedures:  None    Discharge  Clinical Impression:  The primary encounter diagnosis was External hemorrhoids. A diagnosis of Acute cystitis with hematuria was also pertinent to this visit.    Follow Up:  [facility]  [facility] your appointment with [facility] on 8/12 for follow-up of your hemorrhoids and rectal bleeding.              Summary of your Discharge Medications      Take these Medications      Details   pramoxine 1 % foam  Commonly known as:  PROCTOFOAM   Place rectally every 2 hours as needed for Hemorrhoids.     sulfamethoxazole-trimethoprim 800-160 MG per tablet  Commonly known as:  BACTRIM DS   Take 1 tablet by mouth 2 times daily for 3 days.            Pt is discharged to home/self care in stable condition.       I have reviewed the information recorded by the scribe for accuracy and agree with its  contents.    ____________________________________________________________________    Imelda Chang acting as a scribe for Dr. Sandor Duque  Dictation # [id number]  Scribe: Imelda Duque MD  08/02/19 6098     2-3x/week

## 2020-02-15 NOTE — CONSULT NOTE ADULT - ATTENDING COMMENTS
Hepatology Staff: Bárbara Moser MD    I saw and examined the patient along with  Dr. Mehta  02-16-20 @ 08:57.    Vitals: Vital Signs Last 24 Hrs  T(C): 36.3 (16 Feb 2020 04:55), Max: 36.8 (15 Feb 2020 21:04)  T(F): 97.4 (16 Feb 2020 04:55), Max: 98.2 (15 Feb 2020 21:04)  HR: 87 (16 Feb 2020 04:55) (75 - 87)  BP: 111/78 (16 Feb 2020 04:55) (108/72 - 147/84)    RR: 18 (16 Feb 2020 04:55) (18 - 18)  SpO2: 96% (16 Feb 2020 04:55) (96% - 97%)    Labs:INR: 1.51 ratio (02-16-20 @ 06:43)  Creatinine, Serum: 0.64 mg/dL (02-16-20 @ 06:43)  Bilirubin Total, Serum: 5.3 mg/dL (02-16-20 @ 06:43)  INR: 1.50 ratio (02-15-20 @ 09:34)      Imaging Studies:  I/O: I&O's Summary    15 Feb 2020 07:01  -  16 Feb 2020 07:00  --------------------------------------------------------  IN: 240 mL / OUT: 0 mL / NET: 240 mL      Nutritional Status:   Albumin, Serum: 2.8 g/dL (02-16-20 @ 06:43)    Recommendations: Mild HE- improving on Lactulose. Add Rifaximine 550 mg PO bid.  Multifocal HCC. We will review CT imaging with radiology for further recommendations. Recommend infections w/u ( blood, urine Cx).    Plan discussed with Primary team. Hepatology Staff: Bárbara Moser MD    I saw and examined the patient along with  Dr. Mehta  02-16-20 @ 08:57.    Vitals: Vital Signs Last 24 Hrs  T(C): 36.3 (16 Feb 2020 04:55), Max: 36.8 (15 Feb 2020 21:04)  T(F): 97.4 (16 Feb 2020 04:55), Max: 98.2 (15 Feb 2020 21:04)  HR: 87 (16 Feb 2020 04:55) (75 - 87)  BP: 111/78 (16 Feb 2020 04:55) (108/72 - 147/84)    RR: 18 (16 Feb 2020 04:55) (18 - 18)  SpO2: 96% (16 Feb 2020 04:55) (96% - 97%)    Labs:INR: 1.51 ratio (02-16-20 @ 06:43)  Creatinine, Serum: 0.64 mg/dL (02-16-20 @ 06:43)  Bilirubin Total, Serum: 5.3 mg/dL (02-16-20 @ 06:43)  INR: 1.50 ratio (02-15-20 @ 09:34)      Imaging Studies:  I/O: I&O's Summary    15 Feb 2020 07:01  -  16 Feb 2020 07:00  --------------------------------------------------------  IN: 240 mL / OUT: 0 mL / NET: 240 mL      Nutritional Status:   Albumin, Serum: 2.8 g/dL (02-16-20 @ 06:43)    Recommendations: Mild HE- much improved on Lactulose. Add Rifaximine 550 mg PO bid.  Multiple liver lesions ? concern for HCC. Patient reports that he had liver lesions for a long time. Outside CT images were reviewed with radiology, and the lesions were not classical for HCC. Hemangiomas remain in the d/d. We will recommend a MRI with and without contrast for further evaluation.  Recommend infectious w/u ( blood, urine Cx) considering admitted with HE.  Plan discussed with Primary team.

## 2020-02-15 NOTE — PROGRESS NOTE ADULT - ASSESSMENT
The patient is a 65 year old non-domisciled man with a history if cirrhosis secondary to alcohol and drug abuse, subdural hemorrhage, afib (not on AC) is transferred from [facility] for further work up of hepatic masses.

## 2020-02-15 NOTE — PHYSICAL THERAPY INITIAL EVALUATION ADULT - TRANSFER TRAINING, PT EVAL
GOAL: Pt will perform all functional transfers independently with least restrictive AD within 3-4 wks.
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
## 2020-02-16 LAB
ALBUMIN SERPL ELPH-MCNC: 2.8 G/DL — LOW (ref 3.3–5)
ALP SERPL-CCNC: 193 U/L — HIGH (ref 40–120)
ALT FLD-CCNC: 56 U/L — HIGH (ref 10–45)
ANION GAP SERPL CALC-SCNC: 13 MMOL/L — SIGNIFICANT CHANGE UP (ref 5–17)
AST SERPL-CCNC: 102 U/L — HIGH (ref 10–40)
BILIRUB SERPL-MCNC: 5.3 MG/DL — HIGH (ref 0.2–1.2)
BUN SERPL-MCNC: 18 MG/DL — SIGNIFICANT CHANGE UP (ref 7–23)
CALCIUM SERPL-MCNC: 9.1 MG/DL — SIGNIFICANT CHANGE UP (ref 8.4–10.5)
CHLORIDE SERPL-SCNC: 103 MMOL/L — SIGNIFICANT CHANGE UP (ref 96–108)
CO2 SERPL-SCNC: 22 MMOL/L — SIGNIFICANT CHANGE UP (ref 22–31)
CREAT SERPL-MCNC: 0.64 MG/DL — SIGNIFICANT CHANGE UP (ref 0.5–1.3)
GLUCOSE SERPL-MCNC: 98 MG/DL — SIGNIFICANT CHANGE UP (ref 70–99)
HCT VFR BLD CALC: 31.9 % — LOW (ref 39–50)
HGB BLD-MCNC: 10.2 G/DL — LOW (ref 13–17)
INR BLD: 1.51 RATIO — HIGH (ref 0.88–1.16)
MCHC RBC-ENTMCNC: 32 GM/DL — SIGNIFICANT CHANGE UP (ref 32–36)
MCHC RBC-ENTMCNC: 34.7 PG — HIGH (ref 27–34)
MCV RBC AUTO: 108.5 FL — HIGH (ref 80–100)
NRBC # BLD: 0 /100 WBCS — SIGNIFICANT CHANGE UP (ref 0–0)
PLATELET # BLD AUTO: 309 K/UL — SIGNIFICANT CHANGE UP (ref 150–400)
POTASSIUM SERPL-MCNC: 4.1 MMOL/L — SIGNIFICANT CHANGE UP (ref 3.5–5.3)
POTASSIUM SERPL-SCNC: 4.1 MMOL/L — SIGNIFICANT CHANGE UP (ref 3.5–5.3)
PROT SERPL-MCNC: 7.9 G/DL — SIGNIFICANT CHANGE UP (ref 6–8.3)
PROTHROM AB SERPL-ACNC: 17.4 SEC — HIGH (ref 10–12.9)
RBC # BLD: 2.94 M/UL — LOW (ref 4.2–5.8)
RBC # FLD: 14.5 % — SIGNIFICANT CHANGE UP (ref 10.3–14.5)
SODIUM SERPL-SCNC: 138 MMOL/L — SIGNIFICANT CHANGE UP (ref 135–145)
WBC # BLD: 9.01 K/UL — SIGNIFICANT CHANGE UP (ref 3.8–10.5)
WBC # FLD AUTO: 9.01 K/UL — SIGNIFICANT CHANGE UP (ref 3.8–10.5)

## 2020-02-16 PROCEDURE — 99233 SBSQ HOSP IP/OBS HIGH 50: CPT

## 2020-02-16 RX ORDER — BENZOCAINE AND MENTHOL 5; 1 G/100ML; G/100ML
1 LIQUID ORAL ONCE
Refills: 0 | Status: COMPLETED | OUTPATIENT
Start: 2020-02-16 | End: 2020-02-16

## 2020-02-16 RX ADMIN — Medication 1 MILLIGRAM(S): at 13:11

## 2020-02-16 RX ADMIN — Medication 1 TABLET(S): at 13:11

## 2020-02-16 RX ADMIN — Medication 100 MILLIGRAM(S): at 13:11

## 2020-02-16 RX ADMIN — BENZOCAINE AND MENTHOL 1 LOZENGE: 5; 1 LIQUID ORAL at 06:02

## 2020-02-16 RX ADMIN — Medication 25 MILLIGRAM(S): at 17:33

## 2020-02-16 RX ADMIN — Medication 25 MILLIGRAM(S): at 05:22

## 2020-02-16 RX ADMIN — MIRTAZAPINE 15 MILLIGRAM(S): 45 TABLET, ORALLY DISINTEGRATING ORAL at 21:38

## 2020-02-16 RX ADMIN — Medication 6 MILLIGRAM(S): at 21:38

## 2020-02-16 RX ADMIN — PANTOPRAZOLE SODIUM 40 MILLIGRAM(S): 20 TABLET, DELAYED RELEASE ORAL at 05:22

## 2020-02-16 NOTE — PROGRESS NOTE ADULT - PROBLEM SELECTOR PLAN 2
- patient found to have multiple liver masses this past May with concern for metastatic disease vs. Multifocal HCC.   - AFP 36.8  -CEA 6.4  - awaiting imaging to be uploaded to our system, f/u hep recs

## 2020-02-16 NOTE — PROGRESS NOTE ADULT - ASSESSMENT
The patient is a 65 year old non-domisciled man with a history if cirrhosis secondary to alcohol and drug abuse, subdural hemorrhage, afib (not on AC) is transferred from [facility] for further work up of hepatic masses.

## 2020-02-16 NOTE — PROGRESS NOTE ADULT - PROBLEM SELECTOR PLAN 3
- patient has history of alcohol abuse last drink 2/5/20 out of window for delirium tremens.   -  completed ativan CIWA taper at [facility].   - cont thiamine, mvt

## 2020-02-16 NOTE — PROGRESS NOTE ADULT - PROBLEM SELECTOR PLAN 1
- The patient has a history of cirrhosis without current encephalopathy. recently found to have rising bilirubin at [facility].   - extensive work-up was done at [facility] regarding the etiology. likely multifactorial in the setting of hep b/c infections as well as alcohol abuse.   - appreciate hepatology c/s, serologies pending  - HEP C reactive Hep S Ab reactive, Hep B surface ag nonreactive, core nonreactive  - pending further serologies- HIV nonreactive

## 2020-02-16 NOTE — PROGRESS NOTE ADULT - PROBLEM SELECTOR PLAN 7
DVT prophylaxis: lovenox (SDH was year ago, CT from [facility] from January is stable).    Social work consult  PT consult
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
## 2020-02-17 DIAGNOSIS — K72.90 HEPATIC FAILURE, UNSPECIFIED WITHOUT COMA: ICD-10-CM

## 2020-02-17 LAB
ALBUMIN SERPL ELPH-MCNC: 2.8 G/DL — LOW (ref 3.3–5)
ALP SERPL-CCNC: 200 U/L — HIGH (ref 40–120)
ALT FLD-CCNC: 67 U/L — HIGH (ref 10–45)
ANION GAP SERPL CALC-SCNC: 15 MMOL/L — SIGNIFICANT CHANGE UP (ref 5–17)
APTT BLD: 35.5 SEC — SIGNIFICANT CHANGE UP (ref 27.5–36.3)
AST SERPL-CCNC: 118 U/L — HIGH (ref 10–40)
BILIRUB SERPL-MCNC: 4.5 MG/DL — HIGH (ref 0.2–1.2)
BUN SERPL-MCNC: 18 MG/DL — SIGNIFICANT CHANGE UP (ref 7–23)
CALCIUM SERPL-MCNC: 9 MG/DL — SIGNIFICANT CHANGE UP (ref 8.4–10.5)
CERULOPLASMIN SERPL-MCNC: 39 MG/DL — HIGH (ref 15–30)
CHLORIDE SERPL-SCNC: 101 MMOL/L — SIGNIFICANT CHANGE UP (ref 96–108)
CO2 SERPL-SCNC: 22 MMOL/L — SIGNIFICANT CHANGE UP (ref 22–31)
CREAT SERPL-MCNC: 0.68 MG/DL — SIGNIFICANT CHANGE UP (ref 0.5–1.3)
FERRITIN SERPL-MCNC: 414 NG/ML — HIGH (ref 30–400)
GLUCOSE SERPL-MCNC: 91 MG/DL — SIGNIFICANT CHANGE UP (ref 70–99)
HAV IGG SER QL IA: REACTIVE
HAV IGG SER QL IA: REACTIVE
HCV RNA FLD QL NAA+PROBE: SIGNIFICANT CHANGE UP
HCV RNA SPEC QL PROBE+SIG AMP: SIGNIFICANT CHANGE UP
IGA FLD-MCNC: 2167 MG/DL — HIGH (ref 84–499)
IGG FLD-MCNC: 1271 MG/DL — SIGNIFICANT CHANGE UP (ref 610–1660)
IGM SERPL-MCNC: 100 MG/DL — SIGNIFICANT CHANGE UP (ref 35–242)
INR BLD: 1.55 RATIO — HIGH (ref 0.88–1.16)
IRON SATN MFR SERPL: 36 % — SIGNIFICANT CHANGE UP (ref 16–55)
IRON SATN MFR SERPL: 67 UG/DL — SIGNIFICANT CHANGE UP (ref 45–165)
POTASSIUM SERPL-MCNC: 3.8 MMOL/L — SIGNIFICANT CHANGE UP (ref 3.5–5.3)
POTASSIUM SERPL-SCNC: 3.8 MMOL/L — SIGNIFICANT CHANGE UP (ref 3.5–5.3)
PROT SERPL-MCNC: 8 G/DL — SIGNIFICANT CHANGE UP (ref 6–8.3)
PROTHROM AB SERPL-ACNC: 17.7 SEC — HIGH (ref 10–13.1)
SODIUM SERPL-SCNC: 138 MMOL/L — SIGNIFICANT CHANGE UP (ref 135–145)
TIBC SERPL-MCNC: 185 UG/DL — LOW (ref 220–430)
UIBC SERPL-MCNC: 118 UG/DL — SIGNIFICANT CHANGE UP (ref 110–370)

## 2020-02-17 PROCEDURE — 99233 SBSQ HOSP IP/OBS HIGH 50: CPT

## 2020-02-17 RX ORDER — LACTULOSE 10 G/15ML
20 SOLUTION ORAL THREE TIMES A DAY
Refills: 0 | Status: DISCONTINUED | OUTPATIENT
Start: 2020-02-17 | End: 2020-02-26

## 2020-02-17 RX ADMIN — LACTULOSE 20 GRAM(S): 10 SOLUTION ORAL at 22:27

## 2020-02-17 RX ADMIN — Medication 25 MILLIGRAM(S): at 05:57

## 2020-02-17 RX ADMIN — Medication 6 MILLIGRAM(S): at 22:27

## 2020-02-17 RX ADMIN — MIRTAZAPINE 15 MILLIGRAM(S): 45 TABLET, ORALLY DISINTEGRATING ORAL at 22:27

## 2020-02-17 RX ADMIN — PANTOPRAZOLE SODIUM 40 MILLIGRAM(S): 20 TABLET, DELAYED RELEASE ORAL at 05:57

## 2020-02-17 NOTE — PROGRESS NOTE ADULT - PROBLEM SELECTOR PLAN 3
- patient has history of alcohol abuse last drink 2/5/20 out of window for delirium tremens.   -  completed ativan CIWA taper at [facility].   - cont thiamine, mvt

## 2020-02-17 NOTE — PROGRESS NOTE ADULT - PROBLEM SELECTOR PLAN 1
- The patient has a history of cirrhosis without current encephalopathy. recently found to have rising bilirubin at [facility] and abdominal pain  - extensive work-up was done at [facility] regarding the etiology. likely multifactorial in the setting of hep b/c infections as well as alcohol abuse.   - appreciate hepatology c/s, serologies pending  - HEP C reactive Hep S Ab reactive, Hep B surface ag nonreactive, core nonreactive  - pending further serologies- HIV nonreactive

## 2020-02-17 NOTE — PROGRESS NOTE ADULT - ASSESSMENT
The patient is a 65 year old non-domisciled man with a history if cirrhosis secondary to alcohol and drug abuse, subdural hemorrhage, afib (not on AC) is transferred from [facility] for further work up of hepatic masses.

## 2020-02-17 NOTE — PROGRESS NOTE ADULT - PROBLEM SELECTOR PLAN 8
DVT prophylaxis: lovenox (SDH was year ago, CT from [facility] from January is stable).    Social work consult  PT consult

## 2020-02-17 NOTE — PROGRESS NOTE ADULT - PROBLEM SELECTOR PLAN 2
CT abdomen : numerous hepatic masses. Largest 5.5x 10.9 right lobe.   at [facility]-->CT abdomen 2/11 triple phase: cirrhotic liver with multiple masses suspicious for hepatocellular carcinoma  - AFP 36.8  -CEA 6.4  - hepatology rec MRI w w con
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
## 2020-02-18 LAB
ALBUMIN SERPL ELPH-MCNC: 2.7 G/DL — LOW (ref 3.3–5)
ALP SERPL-CCNC: 218 U/L — HIGH (ref 40–120)
ALT FLD-CCNC: 82 U/L — HIGH (ref 10–45)
ANA TITR SER: NEGATIVE — SIGNIFICANT CHANGE UP
ANION GAP SERPL CALC-SCNC: 13 MMOL/L — SIGNIFICANT CHANGE UP (ref 5–17)
APPEARANCE UR: ABNORMAL
APTT BLD: 33.4 SEC — SIGNIFICANT CHANGE UP (ref 27.5–36.3)
AST SERPL-CCNC: 145 U/L — HIGH (ref 10–40)
BILIRUB SERPL-MCNC: 3.6 MG/DL — HIGH (ref 0.2–1.2)
BILIRUB UR-MCNC: ABNORMAL
BUN SERPL-MCNC: 18 MG/DL — SIGNIFICANT CHANGE UP (ref 7–23)
CALCIUM SERPL-MCNC: 8.9 MG/DL — SIGNIFICANT CHANGE UP (ref 8.4–10.5)
CHLORIDE SERPL-SCNC: 103 MMOL/L — SIGNIFICANT CHANGE UP (ref 96–108)
CO2 SERPL-SCNC: 21 MMOL/L — LOW (ref 22–31)
COLOR SPEC: ABNORMAL
CREAT SERPL-MCNC: 0.64 MG/DL — SIGNIFICANT CHANGE UP (ref 0.5–1.3)
DIFF PNL FLD: NEGATIVE — SIGNIFICANT CHANGE UP
GLUCOSE SERPL-MCNC: 107 MG/DL — HIGH (ref 70–99)
GLUCOSE UR QL: NEGATIVE — SIGNIFICANT CHANGE UP
HBV DNA # SERPL NAA+PROBE: SIGNIFICANT CHANGE UP IU/ML
HBV DNA SERPL NAA+PROBE-LOG#: SIGNIFICANT CHANGE UP LOG10IU/ML
HCT VFR BLD CALC: 31.8 % — LOW (ref 39–50)
HCV RNA FLD QL NAA+PROBE: SIGNIFICANT CHANGE UP
HCV RNA SPEC QL PROBE+SIG AMP: SIGNIFICANT CHANGE UP
HGB BLD-MCNC: 10.3 G/DL — LOW (ref 13–17)
INR BLD: 1.46 RATIO — HIGH (ref 0.88–1.16)
KETONES UR-MCNC: NEGATIVE — SIGNIFICANT CHANGE UP
LEUKOCYTE ESTERASE UR-ACNC: NEGATIVE — SIGNIFICANT CHANGE UP
MCHC RBC-ENTMCNC: 32.4 GM/DL — SIGNIFICANT CHANGE UP (ref 32–36)
MCHC RBC-ENTMCNC: 35.3 PG — HIGH (ref 27–34)
MCV RBC AUTO: 108.9 FL — HIGH (ref 80–100)
MITOCHONDRIA AB SER-ACNC: SIGNIFICANT CHANGE UP
NITRITE UR-MCNC: NEGATIVE — SIGNIFICANT CHANGE UP
NRBC # BLD: 0 /100 WBCS — SIGNIFICANT CHANGE UP (ref 0–0)
PH UR: 6 — SIGNIFICANT CHANGE UP (ref 5–8)
PLATELET # BLD AUTO: 304 K/UL — SIGNIFICANT CHANGE UP (ref 150–400)
POTASSIUM SERPL-MCNC: 3.9 MMOL/L — SIGNIFICANT CHANGE UP (ref 3.5–5.3)
POTASSIUM SERPL-SCNC: 3.9 MMOL/L — SIGNIFICANT CHANGE UP (ref 3.5–5.3)
PROT SERPL-MCNC: 7.7 G/DL — SIGNIFICANT CHANGE UP (ref 6–8.3)
PROT UR-MCNC: ABNORMAL
PROTHROM AB SERPL-ACNC: 17 SEC — HIGH (ref 10–13.1)
RBC # BLD: 2.92 M/UL — LOW (ref 4.2–5.8)
RBC # FLD: 14.6 % — HIGH (ref 10.3–14.5)
SMOOTH MUSCLE AB SER-ACNC: SIGNIFICANT CHANGE UP
SODIUM SERPL-SCNC: 137 MMOL/L — SIGNIFICANT CHANGE UP (ref 135–145)
SP GR SPEC: 1.03 — HIGH (ref 1.01–1.02)
UROBILINOGEN FLD QL: ABNORMAL
WBC # BLD: 8.79 K/UL — SIGNIFICANT CHANGE UP (ref 3.8–10.5)
WBC # FLD AUTO: 8.79 K/UL — SIGNIFICANT CHANGE UP (ref 3.8–10.5)

## 2020-02-18 PROCEDURE — 99233 SBSQ HOSP IP/OBS HIGH 50: CPT

## 2020-02-18 PROCEDURE — 99232 SBSQ HOSP IP/OBS MODERATE 35: CPT | Mod: GC

## 2020-02-18 PROCEDURE — 74183 MRI ABD W/O CNTR FLWD CNTR: CPT | Mod: 26

## 2020-02-18 RX ADMIN — Medication 6 MILLIGRAM(S): at 21:09

## 2020-02-18 RX ADMIN — Medication 25 MILLIGRAM(S): at 06:24

## 2020-02-18 RX ADMIN — PANTOPRAZOLE SODIUM 40 MILLIGRAM(S): 20 TABLET, DELAYED RELEASE ORAL at 06:24

## 2020-02-18 RX ADMIN — Medication 1 MILLIGRAM(S): at 12:24

## 2020-02-18 RX ADMIN — LACTULOSE 20 GRAM(S): 10 SOLUTION ORAL at 21:09

## 2020-02-18 RX ADMIN — LACTULOSE 20 GRAM(S): 10 SOLUTION ORAL at 06:24

## 2020-02-18 RX ADMIN — Medication 100 MILLIGRAM(S): at 12:24

## 2020-02-18 RX ADMIN — Medication 1 TABLET(S): at 12:24

## 2020-02-18 RX ADMIN — Medication 25 MILLIGRAM(S): at 17:29

## 2020-02-18 RX ADMIN — LACTULOSE 20 GRAM(S): 10 SOLUTION ORAL at 12:25

## 2020-02-18 RX ADMIN — ENOXAPARIN SODIUM 40 MILLIGRAM(S): 100 INJECTION SUBCUTANEOUS at 12:24

## 2020-02-18 RX ADMIN — MIRTAZAPINE 15 MILLIGRAM(S): 45 TABLET, ORALLY DISINTEGRATING ORAL at 21:09

## 2020-02-18 NOTE — PROGRESS NOTE ADULT - PROBLEM SELECTOR PLAN 1
-  history of cirrhosis recently found to have rising bilirubin at [facility] and abdominal pain likely component of Alcoholic Hepatitis v HCC   - extensive work-up was done at [facility] regarding the etiology. likely multifactorial in the setting of hep b/c infections as well as alcoholic hepatitis  - appreciate hepatology c/s, serologies pending  - HEP C reactive Hep S Ab reactive, Hep B surface ag nonreactive, core nonreactive, Hep C RNA nonreactive, Hep A IgG reactive, Hep A IgM indertiminate  - pending further serologies- HIV nonreactivem IgA elevated, IgM normal, Ceruloplasmin 39  - T bili improving, Lfts uptrending slightly

## 2020-02-18 NOTE — PROGRESS NOTE ADULT - PROBLEM SELECTOR PLAN 8
DVT prophylaxis: lovenox (SDH was year ago, CT from [facility] from January is stable).    Social work consult  PT consult

## 2020-02-18 NOTE — PROGRESS NOTE ADULT - SUBJECTIVE AND OBJECTIVE BOX
Interval Events:   No new complaints     Hospital Medications:  enoxaparin Injectable 40 milliGRAM(s) SubCutaneous daily  folic acid 1 milliGRAM(s) Oral daily  lactulose Syrup 20 Gram(s) Oral three times a day  melatonin 6 milliGRAM(s) Oral at bedtime  metoprolol tartrate 25 milliGRAM(s) Oral two times a day  mirtazapine 15 milliGRAM(s) Oral at bedtime  multivitamin 1 Tablet(s) Oral daily  pantoprazole    Tablet 40 milliGRAM(s) Oral before breakfast  rifAXIMin 550 milliGRAM(s) Oral two times a day  thiamine 100 milliGRAM(s) Oral daily        ROS:   General:  No fevers, chills or night sweats  ENT:  No sore throat or dysphagia  CV:  No pain or palpitations  Resp:  No dyspnea, cough or  wheezing  GI:  as above  Skin:  No rash or edema  Neuro: no weakness   Hematologic: no bleeding  Musculoskeletal: no muscle pain or join pain  Psych: no agitation      PHYSICAL EXAM:   Vital Signs:  Vital Signs Last 24 Hrs  T(C): 36.9 (2020 04:59), Max: 36.9 (2020 04:59)  T(F): 98.4 (2020 04:59), Max: 98.4 (2020 04:59)  HR: 79 (2020 04:59) (75 - 79)  BP: 140/89 (2020 04:59) (92/62 - 140/89)  BP(mean): --  RR: 18 (2020 04:59) (18 - 18)  SpO2: 97% (2020 04:59) (96% - 98%)  Daily     Daily     PHYSICAL EXAM:   GENERAL:  NAD, Appears stated age  HEENT:  NC/AT,  conjunctivae clear and pink, sclera -icteric  CHEST:  CTA B/L, Normal effort  HEART:  RRR S1/S2, No murmurs  ABDOMEN:  Soft, non-tender, non-distended, normoactive bowel sounds,  no masses   EXTREMITIES:  No cyanosis or Edema  SKIN:  Warm & Dry. No rash or erythema  NEURO:  Alert, oriented    LABS:                        10.3   8.79  )-----------( 304      ( 2020 07:03 )             31.8     Mean Cell Volume: 108.9 fl (02-18-20 @ 07:03)        138  |  101  |  18  ----------------------------<  91  3.8   |  22  |  0.68    Ca    9.0      2020 07:01    TPro  8.0  /  Alb  2.8<L>  /  TBili  4.5<H>  /  DBili  x   /  AST  118<H>  /  ALT  67<H>  /  AlkPhos  200<H>      LIVER FUNCTIONS - ( 2020 07:01 )  Alb: 2.8 g/dL / Pro: 8.0 g/dL / ALK PHOS: 200 U/L / ALT: 67 U/L / AST: 118 U/L / GGT: x           PT/INR - ( 2020 08:21 )   PT: 17.7 sec;   INR: 1.55 ratio         PTT - ( 2020 08:21 )  PTT:35.5 sec  Urinalysis Basic - ( 2020 05:22 )    Color: Dark Yellow / Appearance: Slightly Turbid / S.029 / pH: x  Gluc: x / Ketone: Negative  / Bili: Small / Urobili: >8mg/dL   Blood: x / Protein: 30 mg/dL / Nitrite: Negative   Leuk Esterase: Negative / RBC: 3 /hpf / WBC 1 /HPF   Sq Epi: x / Non Sq Epi: 2 /hpf / Bacteria: Negative                              10.3   8.79  )-----------( 304      ( 2020 07:03 )             31.8                         10.2   9.01  )-----------( 309      ( 2020 06:43 )             31.9       Imaging: Interval Events:   Patient offers no complaints.     Hospital Medications:  enoxaparin Injectable 40 milliGRAM(s) SubCutaneous daily  folic acid 1 milliGRAM(s) Oral daily  lactulose Syrup 20 Gram(s) Oral three times a day  melatonin 6 milliGRAM(s) Oral at bedtime  metoprolol tartrate 25 milliGRAM(s) Oral two times a day  mirtazapine 15 milliGRAM(s) Oral at bedtime  multivitamin 1 Tablet(s) Oral daily  pantoprazole    Tablet 40 milliGRAM(s) Oral before breakfast  rifAXIMin 550 milliGRAM(s) Oral two times a day  thiamine 100 milliGRAM(s) Oral daily        ROS:   General:  No fevers, chills or night sweats  ENT:  No sore throat or dysphagia  CV:  No pain or palpitations  Resp:  No dyspnea, cough or  wheezing  GI:  as above  Skin:  No rash or edema  Neuro: no weakness   Hematologic: no bleeding  Musculoskeletal: no muscle pain or join pain  Psych: no agitation      PHYSICAL EXAM:   Vital Signs:  Vital Signs Last 24 Hrs  T(C): 36.9 (2020 04:59), Max: 36.9 (2020 04:59)  T(F): 98.4 (2020 04:59), Max: 98.4 (2020 04:59)  HR: 79 (2020 04:59) (75 - 79)  BP: 140/89 (2020 04:59) (92/62 - 140/89)  BP(mean): --  RR: 18 (2020 04:59) (18 - 18)  SpO2: 97% (2020 04:59) (96% - 98%)  Daily     Daily     PHYSICAL EXAM:   GENERAL:  NAD, Appears stated age  HEENT:  NC/AT,  conjunctivae clear and pink, sclera -icteric  CHEST:  CTA B/L, Normal effort  HEART:  RRR S1/S2, No murmurs  ABDOMEN:  Soft, non-tender, non-distended, normoactive bowel sounds,  no masses   EXTREMITIES:  No cyanosis or Edema  SKIN:  Warm & Dry. No rash or erythema  NEURO:  Alert, oriented    LABS:                        10.3   8.79  )-----------( 304      ( 2020 07:03 )             31.8     Mean Cell Volume: 108.9 fl (02-18-20 @ 07:03)        138  |  101  |  18  ----------------------------<  91  3.8   |  22  |  0.68    Ca    9.0      2020 07:01    TPro  8.0  /  Alb  2.8<L>  /  TBili  4.5<H>  /  DBili  x   /  AST  118<H>  /  ALT  67<H>  /  AlkPhos  200<H>      LIVER FUNCTIONS - ( 2020 07:01 )  Alb: 2.8 g/dL / Pro: 8.0 g/dL / ALK PHOS: 200 U/L / ALT: 67 U/L / AST: 118 U/L / GGT: x           PT/INR - ( 2020 08:21 )   PT: 17.7 sec;   INR: 1.55 ratio         PTT - ( 2020 08:21 )  PTT:35.5 sec  Urinalysis Basic - ( 2020 05:22 )    Color: Dark Yellow / Appearance: Slightly Turbid / S.029 / pH: x  Gluc: x / Ketone: Negative  / Bili: Small / Urobili: >8mg/dL   Blood: x / Protein: 30 mg/dL / Nitrite: Negative   Leuk Esterase: Negative / RBC: 3 /hpf / WBC 1 /HPF   Sq Epi: x / Non Sq Epi: 2 /hpf / Bacteria: Negative                              10.3   8.79  )-----------( 304      ( 2020 07:03 )             31.8                         10.2   9.01  )-----------( 309      ( 2020 06:43 )             31.9       Imaging:

## 2020-02-18 NOTE — PROGRESS NOTE ADULT - PROBLEM SELECTOR PLAN 3
- patient has history of alcohol abuse last drink 2/5/20 out of window for delirium tremens.   -  completed ativan CIWA taper at [facility].   - cont thiamine, mvt

## 2020-02-18 NOTE — PROGRESS NOTE ADULT - PROBLEM SELECTOR PLAN 2
CT abdomen : numerous hepatic masses. Largest 5.5x 10.9 right lobe.   at [facility]-->CT abdomen 2/11 triple phase: cirrhotic liver with multiple masses suspicious for hepatocellular carcinoma  - AFP 36.8  -CEA 6.4  - hepatology rec MRI w w con, pending today

## 2020-02-18 NOTE — PROGRESS NOTE ADULT - ASSESSMENT
The patient is a 65 year old non-domisciled man with a history if cirrhosis secondary to alcohol and drug abuse, subdural hemorrhage, afib (not on AC) is transferred from [facility] for further work up of hepatic masses.
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
## 2020-02-18 NOTE — PROGRESS NOTE ADULT - ASSESSMENT
65 year old man with medical history of A fib not on anticoagulation, h/o subdural hematoma, Alcohol abuse , Cirrhosis, hepatitis C presented to [facility] as a transfer from [facility] for further management of his liver mass.  Records reviewed.   On 7/2014 CT showed 1st vertebral body compression fracture. CT abdomen: hypodense lesion 6.5 cm in right hepatic lobe.   07/2014 Hep C serology positive   07.2014 AFP: 23. CEA: 6,18    05/2019:  CT abdomen: Right hepatic lobe lesion 5.3 x 5.7 x 6.1 cm and multiple other lesions. Lymphadenopathy  05/2019 Colonoscopy and EGD: colonic diverticulosis. No varices reported.    02/2020 Admitted to [facility] for abdominal pain. CT abdomen : numerous hepatic masses. Largest 5.5x 10.9 right lobe.   Ethanol level: 236. CT guided biopsy planned on 02/07 but failed due to technical difficulty.   Urine tox positive for cocaine   CT abdomen 2/11 triple phase: cirrhotic liver with multiple masses suspicious for hepatocellular carcinoma  AFP: 34.7   CEA: 6.52  Hepatitis B core IgG and Hepatitis C Ab are reactive.    Outside CT images were reviewed with radiology, and the lesions were not classical for HCC. Hemangiomas remain in the d/d.     Impression:  # Elevated liver enzymes : likely due to Alcoholic hepatitis ( DF score: 26)  # Liver lesions and elevated AFP DDx: Hemangiomas, HCC  # Hepatitis C exposure   # Alcohol and cocaine abuse   # A fib    Recommendations:   - trend CBC, INR, CMP   - Follow up Hepatitis B PCR- DNA  - avoid all hepatotoxic agents  - encourage EtOH cessation  - Nutrition support 65 year old man with medical history of A fib not on anticoagulation, h/o subdural hematoma, Alcohol abuse , Cirrhosis, hepatitis C presented to [facility] as a transfer from [facility] for further management of his liver mass.  Records reviewed.   On 7/2014 CT showed 1st vertebral body compression fracture. CT abdomen: hypodense lesion 6.5 cm in right hepatic lobe.   07/2014 Hep C serology positive   07.2014 AFP: 23. CEA: 6,18    05/2019:  CT abdomen: Right hepatic lobe lesion 5.3 x 5.7 x 6.1 cm and multiple other lesions. Lymphadenopathy  05/2019 Colonoscopy and EGD: colonic diverticulosis. No varices reported.    02/2020 Admitted to [facility] for abdominal pain. CT abdomen : numerous hepatic masses. Largest 5.5x 10.9 right lobe.   Ethanol level: 236. CT guided biopsy planned on 02/07 but failed due to technical difficulty.   Urine tox positive for cocaine   CT abdomen 2/11 triple phase: cirrhotic liver with multiple masses suspicious for hepatocellular carcinoma  AFP: 34.7   CEA: 6.52  Hepatitis B core IgG and Hepatitis C Ab are reactive.    Outside CT images were reviewed with radiology, and the lesions were not classical for HCC. Hemangiomas remain in the d/d.     Impression:  # Elevated liver enzymes : likely due to Alcoholic hepatitis ( DF score: 26)  # Liver lesions and elevated AFP DDx: Hemangiomas, HCC  # Hepatitis C exposure   # Alcohol and cocaine abuse   # A fib  # Elevated IgA    Recommendations:   - trend CBC, INR, CMP   - Follow up Hepatitis B PCR- DNA  - avoid all hepatotoxic agents  - encourage EtOH cessation  - Nutrition support   - check SPEP/UPEP given elevated IgA 65 year old man with medical history of A fib not on anticoagulation, h/o subdural hematoma, Alcohol abuse , Cirrhosis, hepatitis C presented to [facility] as a transfer from [facility] for further management of his liver mass.  Records reviewed.   On 7/2014 CT showed 1st vertebral body compression fracture. CT abdomen: hypodense lesion 6.5 cm in right hepatic lobe.   07/2014 Hep C serology positive   07.2014 AFP: 23. CEA: 6,18    05/2019:  CT abdomen: Right hepatic lobe lesion 5.3 x 5.7 x 6.1 cm and multiple other lesions. Lymphadenopathy  05/2019 Colonoscopy and EGD: colonic diverticulosis. No varices reported.    02/2020 Admitted to [facility] for abdominal pain. CT abdomen : numerous hepatic masses. Largest 5.5x 10.9 right lobe.   Ethanol level: 236. CT guided biopsy planned on 02/07 but failed due to technical difficulty.   Urine tox positive for cocaine   CT abdomen 2/11 triple phase: cirrhotic liver with multiple masses suspicious for hepatocellular carcinoma  AFP: 34.7   CEA: 6.52  Hepatitis B core IgG and Hepatitis C Ab are reactive.    Outside CT images were reviewed with radiology, and the lesions were not classical for HCC. Hemangiomas remain in the d/d.     Impression:  # Elevated liver enzymes : likely due to Alcoholic hepatitis ( DF score: 26)  # Liver lesions and elevated AFP reviewed MRI W Dr. Willsi, multifocal HCC  # Hepatitis C exposure   # Alcohol and cocaine abuse   # A fib  # Elevated IgA    Recommendations:   -consult IR for therapeutic options for multifocal HCC  - trend CBC, INR, CMP   - Follow up Hepatitis B PCR- DNA  - avoid all hepatotoxic agents  - encourage EtOH cessation  - Nutrition support   - check SPEP/UPEP given elevated IgA

## 2020-02-19 DIAGNOSIS — C22.0 LIVER CELL CARCINOMA: ICD-10-CM

## 2020-02-19 LAB
% ALBUMIN: 35.8 % — SIGNIFICANT CHANGE UP
% ALPHA 1: 5.8 % — SIGNIFICANT CHANGE UP
% ALPHA 2: 10.5 % — SIGNIFICANT CHANGE UP
% BETA: 29.4 % — SIGNIFICANT CHANGE UP
% GAMMA: 18.5 % — SIGNIFICANT CHANGE UP
% M SPIKE: 14.4 % — SIGNIFICANT CHANGE UP
ALBUMIN SERPL ELPH-MCNC: 2.9 G/DL — LOW (ref 3.6–5.5)
ALBUMIN/GLOB SERPL ELPH: 0.6 RATIO — SIGNIFICANT CHANGE UP
ALPHA1 GLOB SERPL ELPH-MCNC: 0.5 G/DL — HIGH (ref 0.1–0.4)
ALPHA2 GLOB SERPL ELPH-MCNC: 0.8 G/DL — SIGNIFICANT CHANGE UP (ref 0.5–1)
B-GLOBULIN SERPL ELPH-MCNC: 2.4 G/DL — HIGH (ref 0.5–1)
CULTURE RESULTS: SIGNIFICANT CHANGE UP
GAMMA GLOBULIN: 1.5 G/DL — SIGNIFICANT CHANGE UP (ref 0.6–1.6)
INTERPRETATION SERPL IFE-IMP: SIGNIFICANT CHANGE UP
M-SPIKE: 1.2 G/DL — HIGH (ref 0–0)
PROT PATTERN SERPL ELPH-IMP: SIGNIFICANT CHANGE UP
PROT SERPL-MCNC: 8 G/DL — SIGNIFICANT CHANGE UP (ref 6–8.3)
PROT SERPL-MCNC: 8 G/DL — SIGNIFICANT CHANGE UP (ref 6–8.3)
SPECIMEN SOURCE: SIGNIFICANT CHANGE UP

## 2020-02-19 PROCEDURE — 71260 CT THORAX DX C+: CPT | Mod: 26

## 2020-02-19 PROCEDURE — 99233 SBSQ HOSP IP/OBS HIGH 50: CPT

## 2020-02-19 PROCEDURE — 99232 SBSQ HOSP IP/OBS MODERATE 35: CPT | Mod: GC

## 2020-02-19 RX ADMIN — LACTULOSE 20 GRAM(S): 10 SOLUTION ORAL at 05:21

## 2020-02-19 RX ADMIN — Medication 25 MILLIGRAM(S): at 17:45

## 2020-02-19 RX ADMIN — LACTULOSE 20 GRAM(S): 10 SOLUTION ORAL at 13:59

## 2020-02-19 RX ADMIN — Medication 1 MILLIGRAM(S): at 12:52

## 2020-02-19 RX ADMIN — PANTOPRAZOLE SODIUM 40 MILLIGRAM(S): 20 TABLET, DELAYED RELEASE ORAL at 05:21

## 2020-02-19 RX ADMIN — MIRTAZAPINE 15 MILLIGRAM(S): 45 TABLET, ORALLY DISINTEGRATING ORAL at 21:14

## 2020-02-19 RX ADMIN — Medication 100 MILLIGRAM(S): at 12:52

## 2020-02-19 RX ADMIN — Medication 25 MILLIGRAM(S): at 05:21

## 2020-02-19 RX ADMIN — Medication 6 MILLIGRAM(S): at 21:14

## 2020-02-19 RX ADMIN — ENOXAPARIN SODIUM 40 MILLIGRAM(S): 100 INJECTION SUBCUTANEOUS at 13:59

## 2020-02-19 RX ADMIN — Medication 1 TABLET(S): at 12:52
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
## 2020-02-19 NOTE — PROGRESS NOTE ADULT - PROBLEM SELECTOR PLAN 2
-  history of cirrhosis recently found to have rising bilirubin at [facility] and abdominal pain likely component of Alcoholic Hepatitis v HCC   - extensive work-up was done at [facility] regarding the etiology. likely multifactorial in the setting of hep b/c infections as well as alcoholic hepatitis  - appreciate hepatology c/s, serologies pending  - HEP C reactive Hep S Ab reactive, Hep B surface ag nonreactive, core nonreactive, Hep C RNA nonreactive, Hep A IgG reactive, Hep A IgM indertiminate  - pending further serologies- HIV nonreactive  IgA elevated, IgM normal, Ceruloplasmin 39, SPEP/UPEP pending  - T bili improving, Lfts uptrending slightly
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
## 2020-02-19 NOTE — PROGRESS NOTE ADULT - PROBLEM SELECTOR PLAN 7
DVT prophylaxis: lovenox (SDH was year ago, CT from [facility] from January is stable).    Social work consult  PT consult

## 2020-02-19 NOTE — PROGRESS NOTE ADULT - ASSESSMENT
The patient is a 65 year old non-domisciled man with a history if cirrhosis secondary to alcohol and drug abuse, subdural hemorrhage, afib (not on AC) is transferred from [facility] for further work up of hepatic masses now confirmed as extensive HCC

## 2020-02-19 NOTE — PROGRESS NOTE ADULT - SUBJECTIVE AND OBJECTIVE BOX
Patient is a 65y old  Male who presents with a chief complaint of Cirrhosis with likely liver malignancy (2020 11:41)      SUBJECTIVE / OVERNIGHT EVENTS:  no events  MEDICATIONS  (STANDING):  enoxaparin Injectable 40 milliGRAM(s) SubCutaneous daily  folic acid 1 milliGRAM(s) Oral daily  lactulose Syrup 20 Gram(s) Oral three times a day  melatonin 6 milliGRAM(s) Oral at bedtime  metoprolol tartrate 25 milliGRAM(s) Oral two times a day  mirtazapine 15 milliGRAM(s) Oral at bedtime  multivitamin 1 Tablet(s) Oral daily  pantoprazole    Tablet 40 milliGRAM(s) Oral before breakfast  rifAXIMin 550 milliGRAM(s) Oral two times a day  thiamine 100 milliGRAM(s) Oral daily    MEDICATIONS  (PRN):              PHYSICAL EXAM:  GENERAL: NAD  HEAD:  Atraumatic, Normocephalic  EYES: EOMI, PERRLA, conjunctiva and sclera anicteric  NECK: Supple, No JVD  CHEST/LUNG: Clear to auscultation bilaterally; No wheeze  HEART: Regular rate and rhythm; No murmurs, rubs, or gallops  ABDOMEN: Soft, Nontender, Nondistended; Bowel sounds present, no hepatosplenomegaly, no rebound or guarding  EXTREMITIES:  2+ Peripheral Pulses, No clubbing, cyanosis, or edema    NEUROLOGY: non-focal, no asterixis  SKIN: No rashes or lesion    LABS:                        10.3   8.79  )-----------( 304      ( 2020 07:03 )             31.8     02-    137  |  103  |  18  ----------------------------<  107<H>  3.9   |  21<L>  |  0.64    Ca    8.9      2020 07:03    TPro  7.7  /  Alb  2.7<L>  /  TBili  3.6<H>  /  DBili  x   /  AST  145<H>  /  ALT  82<H>  /  AlkPhos  218<H>  18    LIVER FUNCTIONS - ( 2020 07:03 )  Alb: 2.7 g/dL / Pro: 7.7 g/dL / ALK PHOS: 218 U/L / ALT: 82 U/L / AST: 145 U/L / GGT: x           PT/INR - ( 2020 08:33 )   PT: 17.0 sec;   INR: 1.46 ratio         PTT - ( 2020 08:33 )  PTT:33.4 sec      Urinalysis Basic - ( 2020 05:22 )    Color: Dark Yellow / Appearance: Slightly Turbid / S.029 / pH: x  Gluc: x / Ketone: Negative  / Bili: Small / Urobili: >8mg/dL   Blood: x / Protein: 30 mg/dL / Nitrite: Negative   Leuk Esterase: Negative / RBC: 3 /hpf / WBC 1 /HPF   Sq Epi: x / Non Sq Epi: 2 /hpf / Bacteria: Negative        RADIOLOGY & ADDITIONAL TESTS: Patient is a 65y old  Male who presents with a chief complaint of Cirrhosis with likely liver malignancy (2020 11:41)      SUBJECTIVE / OVERNIGHT EVENTs and ROS:   no events  Patient denies nausea, vomiting, abdominal pain.   Mental status: normal  Cardiopulmonary: unchanged        MEDICATIONS  (STANDING):  enoxaparin Injectable 40 milliGRAM(s) SubCutaneous daily  folic acid 1 milliGRAM(s) Oral daily  lactulose Syrup 20 Gram(s) Oral three times a day  melatonin 6 milliGRAM(s) Oral at bedtime  metoprolol tartrate 25 milliGRAM(s) Oral two times a day  mirtazapine 15 milliGRAM(s) Oral at bedtime  multivitamin 1 Tablet(s) Oral daily  pantoprazole    Tablet 40 milliGRAM(s) Oral before breakfast  rifAXIMin 550 milliGRAM(s) Oral two times a day  thiamine 100 milliGRAM(s) Oral daily    MEDICATIONS  (PRN):              PHYSICAL EXAM:  GENERAL: NAD  HEAD:  Atraumatic, Normocephalic  EYES: EOMI, PERRLA, conjunctiva and sclera anicteric  NECK: Supple, No JVD  CHEST/LUNG: Clear to auscultation bilaterally; No wheeze  HEART: Regular rate and rhythm; No murmurs, rubs, or gallops  ABDOMEN: Soft, Nontender, Nondistended; Bowel sounds present, no hepatosplenomegaly, no rebound or guarding  EXTREMITIES:  2+ Peripheral Pulses, No clubbing, cyanosis, or edema    NEUROLOGY: non-focal, no asterixis  SKIN: No rashes or lesion    LABS:                        10.3   8.79  )-----------( 304      ( 2020 07:03 )             31.8     02-18    137  |  103  |  18  ----------------------------<  107<H>  3.9   |  21<L>  |  0.64    Ca    8.9      2020 07:03    TPro  7.7  /  Alb  2.7<L>  /  TBili  3.6<H>  /  DBili  x   /  AST  145<H>  /  ALT  82<H>  /  AlkPhos  218<H>  02-18    LIVER FUNCTIONS - ( 2020 07:03 )  Alb: 2.7 g/dL / Pro: 7.7 g/dL / ALK PHOS: 218 U/L / ALT: 82 U/L / AST: 145 U/L / GGT: x           PT/INR - ( 2020 08:33 )   PT: 17.0 sec;   INR: 1.46 ratio         PTT - ( 2020 08:33 )  PTT:33.4 sec      Urinalysis Basic - ( 2020 05:22 )    Color: Dark Yellow / Appearance: Slightly Turbid / S.029 / pH: x  Gluc: x / Ketone: Negative  / Bili: Small / Urobili: >8mg/dL   Blood: x / Protein: 30 mg/dL / Nitrite: Negative   Leuk Esterase: Negative / RBC: 3 /hpf / WBC 1 /HPF   Sq Epi: x / Non Sq Epi: 2 /hpf / Bacteria: Negative        RADIOLOGY & ADDITIONAL TESTS:

## 2020-02-19 NOTE — PROGRESS NOTE ADULT - ASSESSMENT
65 year old man with medical history of A fib not on anticoagulation, h/o subdural hematoma, Alcohol abuse , Cirrhosis, hepatitis C presented to [facility] as a transfer from [facility] for further management of his liver mass.  Records reviewed.   On 7/2014 CT showed 1st vertebral body compression fracture. CT abdomen: hypodense lesion 6.5 cm in right hepatic lobe.   07/2014 Hep C serology positive   07.2014 AFP: 23. CEA: 6,18    05/2019:  CT abdomen: Right hepatic lobe lesion 5.3 x 5.7 x 6.1 cm and multiple other lesions. Lymphadenopathy  05/2019 Colonoscopy and EGD: colonic diverticulosis. No varices reported.    02/2020 Admitted to [facility] for abdominal pain. CT abdomen : numerous hepatic masses. Largest 5.5x 10.9 right lobe.   Ethanol level: 236. CT guided biopsy planned on 02/07 but failed due to technical difficulty.   Urine tox positive for cocaine   CT abdomen 2/11 triple phase: cirrhotic liver with multiple masses suspicious for hepatocellular carcinoma  AFP: 34.7   CEA: 6.52  Hepatitis B core IgG and Hepatitis C Ab are reactive.    Outside CT images were reviewed with radiology, and the lesions were not classical for HCC. Hemangiomas remain in the d/d.     Impression:  # Multifocal HCC  # Hepatitis C exposure   # Alcohol and cocaine abuse   # A fib  # Elevated IgA    Recommendations:   -consult IR and onc for therapeutic options for multifocal HCC 65 year old man with medical history of A fib not on anticoagulation, h/o subdural hematoma, Alcohol abuse , Cirrhosis, hepatitis C presented to [facility] as a transfer from [facility] for further management of his liver mass.  Records reviewed.   On 7/2014 CT showed 1st vertebral body compression fracture. CT abdomen: hypodense lesion 6.5 cm in right hepatic lobe.   07/2014 Hep C serology positive   07.2014 AFP: 23. CEA: 6,18    05/2019:  CT abdomen: Right hepatic lobe lesion 5.3 x 5.7 x 6.1 cm and multiple other lesions. Lymphadenopathy  05/2019 Colonoscopy and EGD: colonic diverticulosis. No varices reported.    02/2020 Admitted to [facility] for abdominal pain. CT abdomen : numerous hepatic masses. Largest 5.5x 10.9 right lobe.   Ethanol level: 236. CT guided biopsy planned on 02/07 but failed due to technical difficulty.   Urine tox positive for cocaine   CT abdomen 2/11 triple phase: cirrhotic liver with multiple masses suspicious for hepatocellular carcinoma  AFP: 34.7   CEA: 6.52  Hepatitis B core IgG and Hepatitis C Ab are reactive.    Outside CT images were reviewed with radiology, and the lesions were not classical for HCC. Hemangiomas remain in the d/d.     Impression:  # Multifocal HCC  # Hepatitis C exposure   # Alcohol and cocaine abuse   # A fib  # Elevated IgA    Recommendations:   -consult IR and onc for therapeutic options for multifocal HCC  -I spoke with darlene Wilson and updated her on the current diagnosis 65 year old man with medical history of A fib not on anticoagulation, h/o subdural hematoma, Alcohol abuse , Cirrhosis, hepatitis C presented to [facility] as a transfer from [facility] for further management of his liver mass.  Records reviewed.   On 7/2014 CT showed 1st vertebral body compression fracture. CT abdomen: hypodense lesion 6.5 cm in right hepatic lobe.   07/2014 Hep C serology positive   07.2014 AFP: 23. CEA: 6,18    05/2019:  CT abdomen: Right hepatic lobe lesion 5.3 x 5.7 x 6.1 cm and multiple other lesions. Lymphadenopathy  05/2019 Colonoscopy and EGD: colonic diverticulosis. No varices reported.    02/2020 Admitted to [facility] for abdominal pain. CT abdomen : numerous hepatic masses. Largest 5.5x 10.9 right lobe.   Ethanol level: 236. CT guided biopsy planned on 02/07 but failed due to technical difficulty.   Urine tox positive for cocaine   CT abdomen 2/11 triple phase: cirrhotic liver with multiple masses suspicious for hepatocellular carcinoma  AFP: 34.7   CEA: 6.52  Hepatitis B core IgG and Hepatitis C Ab are reactive.    Outside CT images were reviewed with radiology, and the lesions were not classical for HCC. Hemangiomas remain in the d/d.     Impression:  # Multifocal HCC  # Hepatitis C exposure   # Alcohol and cocaine abuse   # A fib  # Elevated IgA    Recommendations:   -consult IR and onc for therapeutic options for multifocal HCC  -I spoke with darlene Wilson and updated her on the current diagnosis  -obtain CT Chest w IV contrast and bone scan for staging 65 year old man with medical history of A fib not on anticoagulation, h/o subdural hematoma, Alcohol abuse , Cirrhosis, hepatitis C presented to [facility] as a transfer from [facility] for further management of his liver mass.  Records reviewed.   On 7/2014 CT showed 1st vertebral body compression fracture. CT abdomen: hypodense lesion 6.5 cm in right hepatic lobe.   07/2014 Hep C serology positive   07.2014 AFP: 23. CEA: 6,18    05/2019:  CT abdomen: Right hepatic lobe lesion 5.3 x 5.7 x 6.1 cm and multiple other lesions. Lymphadenopathy  05/2019 Colonoscopy and EGD: colonic diverticulosis. No varices reported.    02/2020 Admitted to [facility] for abdominal pain. CT abdomen : numerous hepatic masses. Largest 5.5x 10.9 right lobe.   Ethanol level: 236. CT guided biopsy planned on 02/07 but failed due to technical difficulty.   Urine tox positive for cocaine   CT abdomen 2/11 triple phase: cirrhotic liver with multiple masses suspicious for hepatocellular carcinoma  AFP: 34.7   CEA: 6.52  Hepatitis B core IgG and Hepatitis C Ab are reactive.    Outside CT images were reviewed with radiology, and the lesions were not classical for HCC. Hemangiomas remain in the d/d.     Impression:  # Multifocal HCC  # Hepatitis C exposure   # Alcohol and cocaine abuse   # A fib  # Elevated IgA    Recommendations:   -consult IR and onc for therapeutic options for multifocal HCC  -I spoke with marissa's niece Katie and updated her on the current diagnosis  -obtain CT Chest w IV contrast and bone scan for workup for mets

## 2020-02-19 NOTE — PROGRESS NOTE ADULT - PROBLEM SELECTOR PLAN 1
newly diagnosed HCC, IR and oncology consults pending for therapeutic options  MRI liver 2.18 -->Extensive bilobar hepatocellular neoplasm including infiltrative disease in segment 8, greater than 10 lesions in the right lobe and several lesions in the left hepatic lobe reference lesions as follows  CT abdomen : numerous hepatic masses. Largest 5.5x 10.9 right lobe.   at [facility]-->CT abdomen 2/11 triple phase: cirrhotic liver with multiple masses suspicious for hepatocellular carcinoma  - AFP 36.8  - CEA 6.4

## 2020-02-20 LAB
A1AT STL-MCNC: 5 MG/DL — SIGNIFICANT CHANGE UP
ALBUMIN SERPL ELPH-MCNC: 2.9 G/DL — LOW (ref 3.3–5)
ALP SERPL-CCNC: 241 U/L — HIGH (ref 40–120)
ALT FLD-CCNC: 82 U/L — HIGH (ref 10–45)
ANION GAP SERPL CALC-SCNC: 13 MMOL/L — SIGNIFICANT CHANGE UP (ref 5–17)
APTT BLD: 37 SEC — HIGH (ref 27.5–36.3)
AST SERPL-CCNC: 129 U/L — HIGH (ref 10–40)
BILIRUB SERPL-MCNC: 3 MG/DL — HIGH (ref 0.2–1.2)
BUN SERPL-MCNC: 21 MG/DL — SIGNIFICANT CHANGE UP (ref 7–23)
CALCIUM SERPL-MCNC: 9.4 MG/DL — SIGNIFICANT CHANGE UP (ref 8.4–10.5)
CHLORIDE SERPL-SCNC: 101 MMOL/L — SIGNIFICANT CHANGE UP (ref 96–108)
CO2 SERPL-SCNC: 23 MMOL/L — SIGNIFICANT CHANGE UP (ref 22–31)
CREAT SERPL-MCNC: 0.71 MG/DL — SIGNIFICANT CHANGE UP (ref 0.5–1.3)
GLUCOSE SERPL-MCNC: 112 MG/DL — HIGH (ref 70–99)
HCT VFR BLD CALC: 32.8 % — LOW (ref 39–50)
HGB BLD-MCNC: 10.4 G/DL — LOW (ref 13–17)
INR BLD: 1.54 RATIO — HIGH (ref 0.88–1.16)
MCHC RBC-ENTMCNC: 31.7 GM/DL — LOW (ref 32–36)
MCHC RBC-ENTMCNC: 35.1 PG — HIGH (ref 27–34)
MCV RBC AUTO: 110.8 FL — HIGH (ref 80–100)
NRBC # BLD: 0 /100 WBCS — SIGNIFICANT CHANGE UP (ref 0–0)
PLATELET # BLD AUTO: 336 K/UL — SIGNIFICANT CHANGE UP (ref 150–400)
POTASSIUM SERPL-MCNC: 3.9 MMOL/L — SIGNIFICANT CHANGE UP (ref 3.5–5.3)
POTASSIUM SERPL-SCNC: 3.9 MMOL/L — SIGNIFICANT CHANGE UP (ref 3.5–5.3)
PROT SERPL-MCNC: 8.3 G/DL — SIGNIFICANT CHANGE UP (ref 6–8.3)
PROTHROM AB SERPL-ACNC: 17.6 SEC — HIGH (ref 10–13.1)
RBC # BLD: 2.96 M/UL — LOW (ref 4.2–5.8)
RBC # FLD: 14.6 % — HIGH (ref 10.3–14.5)
SODIUM SERPL-SCNC: 137 MMOL/L — SIGNIFICANT CHANGE UP (ref 135–145)
WBC # BLD: 8.79 K/UL — SIGNIFICANT CHANGE UP (ref 3.8–10.5)
WBC # FLD AUTO: 8.79 K/UL — SIGNIFICANT CHANGE UP (ref 3.8–10.5)

## 2020-02-20 PROCEDURE — 99233 SBSQ HOSP IP/OBS HIGH 50: CPT

## 2020-02-20 PROCEDURE — 99223 1ST HOSP IP/OBS HIGH 75: CPT | Mod: GC

## 2020-02-20 PROCEDURE — 78306 BONE IMAGING WHOLE BODY: CPT | Mod: 26

## 2020-02-20 PROCEDURE — 99232 SBSQ HOSP IP/OBS MODERATE 35: CPT | Mod: GC

## 2020-02-20 RX ADMIN — LACTULOSE 20 GRAM(S): 10 SOLUTION ORAL at 14:42

## 2020-02-20 RX ADMIN — PANTOPRAZOLE SODIUM 40 MILLIGRAM(S): 20 TABLET, DELAYED RELEASE ORAL at 05:50

## 2020-02-20 RX ADMIN — Medication 25 MILLIGRAM(S): at 18:54

## 2020-02-20 RX ADMIN — Medication 100 MILLIGRAM(S): at 12:35

## 2020-02-20 RX ADMIN — Medication 25 MILLIGRAM(S): at 05:49

## 2020-02-20 RX ADMIN — MIRTAZAPINE 15 MILLIGRAM(S): 45 TABLET, ORALLY DISINTEGRATING ORAL at 21:31

## 2020-02-20 RX ADMIN — Medication 1 TABLET(S): at 12:35

## 2020-02-20 RX ADMIN — LACTULOSE 20 GRAM(S): 10 SOLUTION ORAL at 05:50

## 2020-02-20 RX ADMIN — Medication 1 MILLIGRAM(S): at 12:35

## 2020-02-20 RX ADMIN — Medication 6 MILLIGRAM(S): at 21:32

## 2020-02-20 RX ADMIN — ENOXAPARIN SODIUM 40 MILLIGRAM(S): 100 INJECTION SUBCUTANEOUS at 12:35

## 2020-02-20 NOTE — DIETITIAN INITIAL EVALUATION ADULT. - PHYSICAL APPEARANCE
other (specify)/Performed nutrition focused physical exam with pt's consent and RN's supervision and noted moderate muscle loss in temporales, calves, and thighs, mild muscle loss in shoulders, clavicles, scapula, and interosseus, mild fat loss in orbitals, and severe fat loss in triceps. Ht: 67 inches Wt: 153.4 pounds BMI: 24.0 kg/m2 IBW: 148 (+/-10%) 103.4 %IBW  No noted edema as per flow sheets.   Skin: no noted pressure injuries as per documentation.

## 2020-02-20 NOTE — DIETITIAN INITIAL EVALUATION ADULT. - PROBLEM SELECTOR PLAN 1
- The patient has a history of cirrhosis without current encephalopathy. recently found to have rising bilirubin at [facility].   - extensive work-up was done at [facility] regarding the etiology. likely multifactorial in the setting of hep b/c infections as well as alcohol abuse.   - consult hepatology in AM regarding poss further work up.  - Consult IR for Biopsy  - get CEA AFP, hepatology serologies in AM.   - calculate meld in AM  - patient with protein gap of unknown etiol

## 2020-02-20 NOTE — PROGRESS NOTE ADULT - PROBLEM SELECTOR PLAN 7
DVT prophylaxis: lovenox (SDH was year ago, CT from [facility] from January is stable).    Social work consult  PT consult

## 2020-02-20 NOTE — DIETITIAN INITIAL EVALUATION ADULT. - NS FNS WEIGHT CHANGE REASON
Pt reports 60 pounds weight loss x 13 months PTA due to decrease PO intake, from 215 to 155 pounds. Weight as per flow sheets (02/14) 153.4 pounds - will continue to monitor (pt sitting at this time).

## 2020-02-20 NOTE — DIETITIAN INITIAL EVALUATION ADULT. - ADD RECOMMEND
1. Will continue to monitor PO intake, weight, labs, skin, GI status. 2. Encourage PO intake and obtain food preferences (obtained at this time). 3. Continue Multivitamin, Vitamin B1, and Folic Acid to optimize nutrient intake due to liver cirrhosis. 4. Stressed the importance of adequate kcal and protein intake to help prevent further weight/muscle loss, provided recommendations to optimize - pt made aware RD remains available. 5. Malnutrition notification placed in chart - spoke to NP.

## 2020-02-20 NOTE — DIETITIAN INITIAL EVALUATION ADULT. - SIGNS/SYMPTOMS
PO intake <75% and 28% weight loss x 13 months; signs of muscle/fat loss Pt with liver cirrhosis and now hepatocellular carcinoma

## 2020-02-20 NOTE — DIETITIAN INITIAL EVALUATION ADULT. - PROBLEM SELECTOR PLAN 2
- patient found to have multiple liver masses this past May with concern for metastatic disease vs. Multifocal HCC.   - patient has history of Hepatitis b and C based on documentation in [facility].   - attempted biopsy at [facility] was aborted. would call IR in AM

## 2020-02-20 NOTE — CONSULT NOTE ADULT - ASSESSMENT
This is a 65 year old non-domisciled man with cirrhosis secondary to alcohol and drug abuse and afib (not on AC) was transferred from [facility] on 2/14 for further work up of hepatic masses. Imaging consistent with HCC.    #multifocal HCC  -known to have liver masses since 2014 per review of records. patient aware of them. work up this current admission includes MRI abdomen that notes masses in both lobes and 3 notable left lobe masses (LI-RADS 5) consistent with HCC.  -discussed the diagnosis with the patient today.   -Child Pickett B (9pts) at this time This is a 65 year old non-domisciled man with cirrhosis secondary to alcohol and drug abuse and afib (not on AC) was transferred from [facility] on 2/14 for further work up of hepatic masses. Imaging consistent with HCC.    #multifocal HCC  -known to have liver masses since 2014 per review of records. patient aware of them. work up this current admission includes MRI abdomen that notes masses in both lobes and 3 notable left lobe masses (LI-RADS 5) consistent with HCC.  -discussed the diagnosis with the patient today.   -Child Pickett B (8pts) at this time  -AFP 36.8  -CT chest result pending. Can hold off on bone scan currently as patient does not have symptoms concerning for bony involvement at this time.  -has been evaluated by IR for local therapy and deemed not a candidate  -will await CT chest for further staging information which will impact options. Systemic treatment is mainly based on child pickett score which is B8 which limits options.    Aleshia Zhang MD  Hematology/Oncology Fellow  Pager: [phone]

## 2020-02-20 NOTE — DIETITIAN INITIAL EVALUATION ADULT. - PROBLEM SELECTOR PLAN 6
- patient has history of HTN on atenolol  - monitor BP consider switching to different medication for BP management given that atenolol is not first line for HTN Tx

## 2020-02-20 NOTE — CHART NOTE - NSCHARTNOTEFT_GEN_A_CORE
Upon Nutritional Assessment by the Registered Dietitian your patient was determined to meet criteria / has evidence of the following diagnosis/diagnoses:          [ ]  Mild Protein Calorie Malnutrition        [ ]  Moderate Protein Calorie Malnutrition        [x] Severe Protein Calorie Malnutrition        [ ] Unspecified Protein Calorie Malnutrition        [ ] Underweight / BMI <19        [ ] Morbid Obesity / BMI > 40      Findings as based on:  [x] Comprehensive nutrition assessment   [x] Nutrition Focused Physical Exam with pt's consent and RN's supervision and noted moderate muscle loss in temporales, calves, and thighs, mild muscle loss in shoulders, clavicles, scapula, and interosseus, mild fat loss in orbitals, and severe fat loss in triceps.  [x] Other: PO intake <75% and 28% weight loss x 13 months      Nutrition Plan/Recommendations:    1. Recommend liberalize to regular diet to allow pt more food options and optimize intake.   2. Recommend Ensure Enlive 2x daily (700 balwinder and 40 gm protein) to optimize kcal and protein intake.  3. Encourage PO intake and obtain food preferences (obtained at this time).   4. Continue Multivitamin, Vitamin B1, and Folic Acid to optimize nutrient intake due to liver cirrhosis.   5. Stressed the importance of adequate kcal and protein intake to help prevent further weight/muscle loss, provided recommendations to optimize.     RD remains available  Bernie Doll MS RDN CDN #[id number]    PROVIDER Section:     By signing this assessment you are acknowledging and agree with the diagnosis/diagnoses assigned by the Registered Dietitian    Comments: Upon Nutritional Assessment by the Registered Dietitian your patient was determined to meet criteria / has evidence of the following diagnosis/diagnoses:          [ ]  Mild Protein Calorie Malnutrition        [ ]  Moderate Protein Calorie Malnutrition        [x] Severe Protein Calorie Malnutrition        [ ] Unspecified Protein Calorie Malnutrition        [ ] Underweight / BMI <19        [ ] Morbid Obesity / BMI > 40      Findings as based on:  [x] Comprehensive nutrition assessment   [x] Nutrition Focused Physical Exam with pt's consent and RN's supervision and noted moderate muscle loss in temporales, calves, and thighs, mild muscle loss in shoulders, clavicles, scapula, and interosseus, mild fat loss in orbitals, and severe fat loss in triceps.  [x] Other: PO intake <75% and 28% weight loss x 13 months      Nutrition Plan/Recommendations:    1. Recommend liberalize to regular diet to allow pt more food options and optimize intake. Will continue to monitor and adjust as needed.   2. Recommend Ensure Enlive 2x daily (700 balwinder and 40 gm protein) to optimize kcal and protein intake.  3. Encourage PO intake and obtain food preferences (obtained at this time).   4. Continue Multivitamin, Vitamin B1, and Folic Acid to optimize nutrient intake due to liver cirrhosis.   5. Stressed the importance of adequate kcal and protein intake to help prevent further weight/muscle loss, provided recommendations to optimize.     RD remains available  Bernie Doll MS, RDN CDN #[id number]    PROVIDER Section:     By signing this assessment you are acknowledging and agree with the diagnosis/diagnoses assigned by the Registered Dietitian    Comments:

## 2020-02-20 NOTE — DIETITIAN INITIAL EVALUATION ADULT. - PROBLEM SELECTOR PLAN 4
- patient 10.6 with  likely from liver disfunction however has alcohol abuse   - will check b12 folate

## 2020-02-20 NOTE — DIETITIAN INITIAL EVALUATION ADULT. - PROBLEM SELECTOR PLAN 5
- recorded history of afib. EKG shows NSR.   - call [facility] in AM to discuss history of afib.  - continue metoprolol 25 BID

## 2020-02-20 NOTE — DIETITIAN INITIAL EVALUATION ADULT. - PROBLEM SELECTOR PLAN 8
DVT prophylaxis: lovenox (SDH was year ago, CT from [facility] from January is stable).    Social work consult  PT consult
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
## 2020-02-20 NOTE — DIETITIAN INITIAL EVALUATION ADULT. - OTHER INFO
Pt reports decreased appetite and PO intake x 13 month PTA due to "living on the streets." Confirms NKFA. Pt reports not following any type of diet or restriction PTA; denies taking vitamins/minerals or drinking any nutritional supplement.     Confirmed information with RN. Pt reports improved appetite and PO intake, but complains about limited food options. Noted 100% PO intake as per flow sheets. Offered nutritional supplement, pt agreed to Ensure Enlive - spoke to NP. Denies difficulty chewing/swallowing. Pt denies nausea, vomiting, diarrhea, or constipation, reports last BM today (02/20).     Stressed the importance of adequate kcal and protein intake to help prevent further weight/muscle loss, provided recommendations to optimize, recommended ordering nutrient-dense snacks and leaving non-perishable food away from tray for later consumption during the day or between meals, to start with protein, and sips of supplement throughout the day; reviewed foods with protein, nutrient-dense snacks, and menu order procedures in hospital. Pt amenable for recommendations - made aware RD remains available.

## 2020-02-20 NOTE — DIETITIAN INITIAL EVALUATION ADULT. - PROBLEM SELECTOR PLAN 3
- patient has history of alcohol abuse last drink 2/5/20 out of window for delirium tremens.   -  completed ativan CIWA taper at [facility].   - monitor for signs of withdrawal.  - There is no signs of withdrawal on physical exam at this time.  - cont thiamine, mvt

## 2020-02-20 NOTE — PROGRESS NOTE ADULT - ASSESSMENT
The patient is a 65 year old non-domisciled man with a history if cirrhosis secondary to alcohol and drug abuse, subdural hemorrhage, afib (not on AC) is transferred from [facility] for further work up of hepatic masses now confirmed as extensive HCC

## 2020-02-20 NOTE — PROGRESS NOTE ADULT - PROBLEM SELECTOR PLAN 1
newly diagnosed HCC, IR deemed not a candidate for local therapy and oncology consults pending for therapeutic options  - Ct chest 2/19 showing no intrapulmonary mets  MRI liver 2.18 -->Extensive bilobar hepatocellular neoplasm including infiltrative disease in segment 8, greater than 10 lesions in the right lobe and several lesions in the left hepatic lobe reference lesions as follows  CT abdomen : numerous hepatic masses. Largest 5.5x 10.9 right lobe.   at [facility]-->CT abdomen 2/11 triple phase: cirrhotic liver with multiple masses suspicious for hepatocellular carcinoma  - AFP 36.8  - CEA 6.4  -pending NM bone scan

## 2020-02-20 NOTE — DIETITIAN INITIAL EVALUATION ADULT. - DIET TYPE
1. Recommend liberalize to regular diet to allow pt more food options and optimize intake. 2. Recommend Ensure Enlive 2x daily (700 balwinder and 40 gm protein) to optimize kcal and protein intake. Discussed diet and supplement with NP. 1. Recommend liberalize to regular diet to allow pt more food options and optimize intake. Will continue to monitor and adjust as needed. 2. Recommend Ensure Enlive 2x daily (700 balwinder and 40 gm protein) to optimize kcal and protein intake. Discussed diet and supplement with NP.

## 2020-02-20 NOTE — PROGRESS NOTE ADULT - ASSESSMENT
65 year old man with medical history of A fib not on anticoagulation, h/o subdural hematoma, Alcohol abuse , Cirrhosis, hepatitis C presented to [facility] as a transfer from [facility] for further management of his liver mass.  Records reviewed.   On 7/2014 CT showed 1st vertebral body compression fracture. CT abdomen: hypodense lesion 6.5 cm in right hepatic lobe.   07/2014 Hep C serology positive   07.2014 AFP: 23. CEA: 6,18    05/2019:  CT abdomen: Right hepatic lobe lesion 5.3 x 5.7 x 6.1 cm and multiple other lesions. Lymphadenopathy  05/2019 Colonoscopy and EGD: colonic diverticulosis. No varices reported.    02/2020 Admitted to [facility] for abdominal pain. CT abdomen : numerous hepatic masses. Largest 5.5x 10.9 right lobe.   Ethanol level: 236. CT guided biopsy planned on 02/07 but failed due to technical difficulty.   Urine tox positive for cocaine   CT abdomen 2/11 triple phase: cirrhotic liver with multiple masses suspicious for hepatocellular carcinoma  AFP: 34.7   CEA: 6.52  Hepatitis B core IgG and Hepatitis C Ab are reactive.    Outside CT images were reviewed with radiology, and the lesions were not classical for HCC. Hemangiomas remain in the d/d.     Impression:  # Multifocal HCC  # Hepatitis C exposure   # Alcohol and cocaine abuse   # A fib  # Elevated IgA    Recommendations:   -consult oncology for therapeutic options for multifocal HCC  -I spoke with marissa's niece Katie and updated her on the current diagnosis  -obtain CT Chest w IV contrast and bone scan for workup for mets 65 year old man with medical history of A fib not on anticoagulation, h/o subdural hematoma, Alcohol abuse , Cirrhosis, hepatitis C presented to [facility] as a transfer from [facility] for further management of his liver mass.  Records reviewed.   On 7/2014 CT showed 1st vertebral body compression fracture. CT abdomen: hypodense lesion 6.5 cm in right hepatic lobe.   07/2014 Hep C serology positive   07.2014 AFP: 23. CEA: 6,18    05/2019:  CT abdomen: Right hepatic lobe lesion 5.3 x 5.7 x 6.1 cm and multiple other lesions. Lymphadenopathy  05/2019 Colonoscopy and EGD: colonic diverticulosis. No varices reported.    02/2020 Admitted to [facility] for abdominal pain. CT abdomen : numerous hepatic masses. Largest 5.5x 10.9 right lobe.   Ethanol level: 236. CT guided biopsy planned on 02/07 but failed due to technical difficulty.   Urine tox positive for cocaine   CT abdomen 2/11 triple phase: cirrhotic liver with multiple masses suspicious for hepatocellular carcinoma  AFP: 34.7   CEA: 6.52  Hepatitis B core IgG and Hepatitis C Ab are reactive.    Outside CT images were reviewed with radiology, and the lesions were not classical for HCC. Hemangiomas remain in the d/d.     Impression:  # Multifocal HCC  # Hepatitis C exposure   # Alcohol and cocaine abuse   # A fib  # Elevated IgA    Recommendations:   -consult oncology for systemic therapy for multifocal HCC  -I spoke with patient's niece Katie and updated her on the current diagnosis  -obtain CT Chest w IV contrast and bone scan for workup for mets

## 2020-02-20 NOTE — DIETITIAN INITIAL EVALUATION ADULT. - REASON INDICATOR FOR ASSESSMENT
Pt seen for length of stay initial assessment.  Information obtained from: medical record, pt, and RN.  Pt confused at times/disoriented as per documentation -?accuracy of information obtained.

## 2020-02-20 NOTE — CONSULT NOTE ADULT - ATTENDING COMMENTS
Case reviewed in detail.C/W advanced HCC, at least a Child's B8.Treatment options are strictly palliative.Will follow w Hepatology service.
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
## 2020-02-20 NOTE — DIETITIAN INITIAL EVALUATION ADULT. - PERTINENT LABORATORY DATA
Physical Therapy Daily Treatment Note     Name: Ratna Beltre  Clinic Number: [id number]    Therapy Diagnosis:   Encounter Diagnosis   Name Primary?    Abdominal weakness      Physician: Freya Santos MD    Visit Date: 10/23/2019    Physician Orders: PT Eval and Treat   Medical Diagnosis from Referral: chronic left sided low back pain with L sided sciatica  Evaluation Date: 10/14/2019  Authorization Period Expiration: 12/31/2019  Plan of Care Expiration: 1/14/2020  Visit # / Visits authorized: 3/ 30     Time In: 4:00pm  Time Out: 5:00pm   Total Billable Time: 60 minutes      Precautions: Standard    Subjective     Pt reports: he has been trying to do the exercises at home, but does not do them every day. Pt states that he is feeling pretty good today though    He was somewhat compliant with home exercise program.  Response to previous treatment: felt good after   Functional change: none sofar     Pain: 3/10  Location: left lower back     Objective     Bolded items were performed today:    Ratna received therapeutic exercises to develop strength, endurance, ROM, posture and core stabilization for 60 minutes including:    Elliptical: 8 minutes   Posterior pelvic tilts : 2 x 10 , 5'' hold   Hamstring stretch c/ strap : 3 x 30''  Piriformis stretch : 3 x 30''   SKTC : 10 x 5''  LTR: 2 x 10  Isometric hip adduction with TrA: 5'' hold , 2 x 10  Side lying clamshells : 2 x 10 B GTB  Bridges with pelvic tilt with GTB : 2 x 10   DKTC with swiss ball : 5'' hold x 15  Braced marching: 2 x 10   L QL stretch in side lying: 3x30 seconds  Forward flexion on Red exercise ball LB stretch x15 fwd, R, L  Core stabilization on blue exercise ball with alternating marches and HHA on plynth         Home Exercises Provided and Patient Education Provided     Education provided:   - Continue previous HEP and updated HEP provided today ( hip adduction , braced marching , SKTC)     Written Home Exercises Provided: yes.  Exercises  were reviewed and Ratna was able to demonstrate them prior to the end of the session.  Ratna demonstrated good  understanding of the education provided.     See EMR under Patient Instructions for exercises provided 10/18/2019.      Assessment     Pt presents with continued low pain levels. Pt demonstrates improved tolerance to activity and improved core endurance. Pt experienced cramping during bridges in hamstrings which was relieved after a few reps. Pt also felt cramping in hip flexors during marching while on ball, but was relieved with standing.     Ratna is progressing well towards his goals.   Pt prognosis is Excellent.     Pt will continue to benefit from skilled outpatient physical therapy to address the deficits listed in the problem list box on initial evaluation, provide pt/family education and to maximize pt's level of independence in the home and community environment.   Pt's spiritual, cultural and educational needs considered and pt agreeable to plan of care and goals.    Anticipated barriers to physical therapy: none    Short Term Goals (4 Weeks):  1. Pt will be compliant with HEP to supplement PT in decreasing pain with functional mobility.  2. Pt will perform pallof press with good control to demonstrate improved core strength.  3. Pt will improve lumbar ROM to </=minimal loss in all planes to improve functional mobility.  4. Pt will improve impaired LE MMTs to >/=4/5 to improve strength for functional tasks.  Long Term Goals (6 Weeks):   1. Pt will improve FOTO score to </= 20% limited to decrease perceived limitation with maintaining/changing body position.   2. Pt will perform bridging with good control to demonstrate improved core strength.  3. Pt will improve impaired LE MMTs to >/=4+/5 to improve strength for functional tasks.  4. Pt will report no pain during lumbar ROM to promote functional mobility.    Plan     Progress mobility and strengthening as tolerated.    Eric Moreira, PT    (02/15) HDL 15; (02/20) , Bilirubin Total 3.0, Alkaline Phosphatase 241, , ALT 82

## 2020-02-20 NOTE — PROGRESS NOTE ADULT - SUBJECTIVE AND OBJECTIVE BOX
Patient is a 65y old  Male who presents with a chief complaint of Cirrhosis with likely liver malignancy (19 Feb 2020 16:39)      SUBJECTIVE / OVERNIGHT EVENTS:  no events  MEDICATIONS  (STANDING):  enoxaparin Injectable 40 milliGRAM(s) SubCutaneous daily  folic acid 1 milliGRAM(s) Oral daily  lactulose Syrup 20 Gram(s) Oral three times a day  melatonin 6 milliGRAM(s) Oral at bedtime  metoprolol tartrate 25 milliGRAM(s) Oral two times a day  mirtazapine 15 milliGRAM(s) Oral at bedtime  multivitamin 1 Tablet(s) Oral daily  pantoprazole    Tablet 40 milliGRAM(s) Oral before breakfast  rifAXIMin 550 milliGRAM(s) Oral two times a day  thiamine 100 milliGRAM(s) Oral daily    MEDICATIONS  (PRN):              PHYSICAL EXAM:  GENERAL: NAD, well-developed  HEAD:  Atraumatic, Normocephalic  EYES: EOMI, PERRLA, conjunctiva and sclera icteric  NECK: Supple, No JVD  CHEST/LUNG: Clear to auscultation bilaterally; No wheeze  HEART: Regular rate and rhythm; No murmurs, rubs, or gallops  ABDOMEN: Soft, Nontender, Nondistended; Bowel sounds present, no hepatosplenomegaly, no rebound or guarding  EXTREMITIES:  2+ Peripheral Pulses, No clubbing, cyanosis, or edema  PSYCH: AAOx3  NEUROLOGY: non-focal, no asterixis  SKIN: No rashes or lesion    LABS:                        10.4   8.79  )-----------( 336      ( 20 Feb 2020 06:59 )             32.8     02-20    137  |  101  |  21  ----------------------------<  112<H>  3.9   |  23  |  0.71    Ca    9.4      20 Feb 2020 06:57    TPro  8.3  /  Alb  2.9<L>  /  TBili  3.0<H>  /  DBili  x   /  AST  129<H>  /  ALT  82<H>  /  AlkPhos  241<H>  02-20    LIVER FUNCTIONS - ( 20 Feb 2020 06:57 )  Alb: 2.9 g/dL / Pro: 8.3 g/dL / ALK PHOS: 241 U/L / ALT: 82 U/L / AST: 129 U/L / GGT: x           PT/INR - ( 20 Feb 2020 08:31 )   PT: 17.6 sec;   INR: 1.54 ratio         PTT - ( 20 Feb 2020 08:31 )  PTT:37.0 sec          RADIOLOGY & ADDITIONAL TESTS: Patient is a 65y old  Male who presents with a chief complaint of Cirrhosis with likely liver malignancy (19 Feb 2020 16:39)      SUBJECTIVE / OVERNIGHT EVENTS and ROS:  no events overnight  No fever, or chills,   Mental status: normal  Cardiopulmonary status: unchanged  Abdominal status: no nausea, vomiting, abdominal pain, or GI bleeding    MEDICATIONS  (STANDING):  enoxaparin Injectable 40 milliGRAM(s) SubCutaneous daily  folic acid 1 milliGRAM(s) Oral daily  lactulose Syrup 20 Gram(s) Oral three times a day  melatonin 6 milliGRAM(s) Oral at bedtime  metoprolol tartrate 25 milliGRAM(s) Oral two times a day  mirtazapine 15 milliGRAM(s) Oral at bedtime  multivitamin 1 Tablet(s) Oral daily  pantoprazole    Tablet 40 milliGRAM(s) Oral before breakfast  rifAXIMin 550 milliGRAM(s) Oral two times a day  thiamine 100 milliGRAM(s) Oral daily    MEDICATIONS  (PRN):              PHYSICAL EXAM:  GENERAL: NAD, well-developed  HEAD:  Atraumatic, Normocephalic  EYES: EOMI, PERRLA, conjunctiva and sclera icteric  NECK: Supple, No JVD  CHEST/LUNG: Clear to auscultation bilaterally; No wheeze  HEART: Regular rate and rhythm; No murmurs, rubs, or gallops  ABDOMEN: Soft, Nontender, Nondistended; Bowel sounds present, no hepatosplenomegaly, no rebound or guarding  EXTREMITIES:  2+ Peripheral Pulses, No clubbing, cyanosis, or edema  PSYCH: AAOx3  NEUROLOGY: non-focal, no asterixis  SKIN: No rashes or lesion    LABS:                        10.4   8.79  )-----------( 336      ( 20 Feb 2020 06:59 )             32.8     02-20    137  |  101  |  21  ----------------------------<  112<H>  3.9   |  23  |  0.71    Ca    9.4      20 Feb 2020 06:57    TPro  8.3  /  Alb  2.9<L>  /  TBili  3.0<H>  /  DBili  x   /  AST  129<H>  /  ALT  82<H>  /  AlkPhos  241<H>  02-20    LIVER FUNCTIONS - ( 20 Feb 2020 06:57 )  Alb: 2.9 g/dL / Pro: 8.3 g/dL / ALK PHOS: 241 U/L / ALT: 82 U/L / AST: 129 U/L / GGT: x           PT/INR - ( 20 Feb 2020 08:31 )   PT: 17.6 sec;   INR: 1.54 ratio         PTT - ( 20 Feb 2020 08:31 )  PTT:37.0 sec          RADIOLOGY & ADDITIONAL TESTS:

## 2020-02-20 NOTE — DIETITIAN INITIAL EVALUATION ADULT. - ENERGY NEEDS
Pertinent information as per chart: Pt 64 y/o M with PMH: non-domisciled man with a history if cirrhosis without ascites secondary to alcohol and drug abuse - completed CIWA, subdural hemorrhage, Afib (not on AC) is transferred from [facility] for further work up of hepatic masses now confirmed as extensive HCC.

## 2020-02-21 DIAGNOSIS — E43 UNSPECIFIED SEVERE PROTEIN-CALORIE MALNUTRITION: ICD-10-CM

## 2020-02-21 LAB
ALBUMIN SERPL ELPH-MCNC: 2.8 G/DL — LOW (ref 3.3–5)
ALP SERPL-CCNC: 238 U/L — HIGH (ref 40–120)
ALT FLD-CCNC: 87 U/L — HIGH (ref 10–45)
AMPHET UR-MCNC: NEGATIVE — SIGNIFICANT CHANGE UP
ANION GAP SERPL CALC-SCNC: 13 MMOL/L — SIGNIFICANT CHANGE UP (ref 5–17)
APTT BLD: 37.1 SEC — HIGH (ref 27.5–36.3)
AST SERPL-CCNC: 144 U/L — HIGH (ref 10–40)
BARBITURATES, URINE.: NEGATIVE — SIGNIFICANT CHANGE UP
BENZODIAZ UR-MCNC: NEGATIVE — SIGNIFICANT CHANGE UP
BILIRUB SERPL-MCNC: 2.8 MG/DL — HIGH (ref 0.2–1.2)
BUN SERPL-MCNC: 18 MG/DL — SIGNIFICANT CHANGE UP (ref 7–23)
CALCIUM SERPL-MCNC: 8.9 MG/DL — SIGNIFICANT CHANGE UP (ref 8.4–10.5)
CHLORIDE SERPL-SCNC: 100 MMOL/L — SIGNIFICANT CHANGE UP (ref 96–108)
CO2 SERPL-SCNC: 23 MMOL/L — SIGNIFICANT CHANGE UP (ref 22–31)
COCAINE METAB.OTHER UR-MCNC: NEGATIVE — SIGNIFICANT CHANGE UP
CREAT SERPL-MCNC: 0.67 MG/DL — SIGNIFICANT CHANGE UP (ref 0.5–1.3)
CREATININE, URINE THERAPEUTIC: 183.8 MG/DL — SIGNIFICANT CHANGE UP
GLUCOSE SERPL-MCNC: 91 MG/DL — SIGNIFICANT CHANGE UP (ref 70–99)
HCT VFR BLD CALC: 30.5 % — LOW (ref 39–50)
HGB BLD-MCNC: 10.1 G/DL — LOW (ref 13–17)
INR BLD: 1.49 RATIO — HIGH (ref 0.88–1.16)
MCHC RBC-ENTMCNC: 33.1 GM/DL — SIGNIFICANT CHANGE UP (ref 32–36)
MCHC RBC-ENTMCNC: 35.9 PG — HIGH (ref 27–34)
MCV RBC AUTO: 108.5 FL — HIGH (ref 80–100)
METHADONE UR-MCNC: NEGATIVE — SIGNIFICANT CHANGE UP
METHAQUALONE UR QL: NEGATIVE — SIGNIFICANT CHANGE UP
METHAQUALONE UR-MCNC: NEGATIVE — SIGNIFICANT CHANGE UP
NRBC # BLD: 0 /100 WBCS — SIGNIFICANT CHANGE UP (ref 0–0)
OPIATES UR-MCNC: NEGATIVE — SIGNIFICANT CHANGE UP
PCP UR-MCNC: NEGATIVE — SIGNIFICANT CHANGE UP
PLATELET # BLD AUTO: 318 K/UL — SIGNIFICANT CHANGE UP (ref 150–400)
POTASSIUM SERPL-MCNC: 3.7 MMOL/L — SIGNIFICANT CHANGE UP (ref 3.5–5.3)
POTASSIUM SERPL-SCNC: 3.7 MMOL/L — SIGNIFICANT CHANGE UP (ref 3.5–5.3)
PROPOXYPH UR QL: NEGATIVE — SIGNIFICANT CHANGE UP
PROT SERPL-MCNC: 7.9 G/DL — SIGNIFICANT CHANGE UP (ref 6–8.3)
PROTHROM AB SERPL-ACNC: 17 SEC — HIGH (ref 10–13.1)
RBC # BLD: 2.81 M/UL — LOW (ref 4.2–5.8)
RBC # FLD: 14.6 % — HIGH (ref 10.3–14.5)
SODIUM SERPL-SCNC: 136 MMOL/L — SIGNIFICANT CHANGE UP (ref 135–145)
THC UR QL: NEGATIVE — SIGNIFICANT CHANGE UP
WBC # BLD: 9.03 K/UL — SIGNIFICANT CHANGE UP (ref 3.8–10.5)
WBC # FLD AUTO: 9.03 K/UL — SIGNIFICANT CHANGE UP (ref 3.8–10.5)

## 2020-02-21 PROCEDURE — 99232 SBSQ HOSP IP/OBS MODERATE 35: CPT | Mod: GC

## 2020-02-21 PROCEDURE — 99233 SBSQ HOSP IP/OBS HIGH 50: CPT

## 2020-02-21 PROCEDURE — 99233 SBSQ HOSP IP/OBS HIGH 50: CPT | Mod: GC

## 2020-02-21 RX ADMIN — Medication 6 MILLIGRAM(S): at 21:05

## 2020-02-21 RX ADMIN — LACTULOSE 20 GRAM(S): 10 SOLUTION ORAL at 15:16

## 2020-02-21 RX ADMIN — Medication 1 TABLET(S): at 12:00

## 2020-02-21 RX ADMIN — LACTULOSE 20 GRAM(S): 10 SOLUTION ORAL at 06:34

## 2020-02-21 RX ADMIN — PANTOPRAZOLE SODIUM 40 MILLIGRAM(S): 20 TABLET, DELAYED RELEASE ORAL at 06:34

## 2020-02-21 RX ADMIN — Medication 1 MILLIGRAM(S): at 12:00

## 2020-02-21 RX ADMIN — Medication 25 MILLIGRAM(S): at 18:44

## 2020-02-21 RX ADMIN — MIRTAZAPINE 15 MILLIGRAM(S): 45 TABLET, ORALLY DISINTEGRATING ORAL at 21:05

## 2020-02-21 RX ADMIN — Medication 25 MILLIGRAM(S): at 06:34

## 2020-02-21 RX ADMIN — LACTULOSE 20 GRAM(S): 10 SOLUTION ORAL at 21:06

## 2020-02-21 RX ADMIN — Medication 100 MILLIGRAM(S): at 12:00

## 2020-02-21 NOTE — PROGRESS NOTE ADULT - PROBLEM SELECTOR PLAN 1
newly diagnosed HCC, IR deemed not a candidate for local therapy and oncology consults pending for therapeutic options  - Ct chest 2/19 showing no intrapulmonary mets  MRI liver 2.18 -->Extensive bilobar hepatocellular neoplasm including infiltrative disease in segment 8, greater than 10 lesions in the right lobe and several lesions in the left hepatic lobe reference lesions as follows  CT abdomen : numerous hepatic masses. Largest 5.5x 10.9 right lobe.   at [facility]-->CT abdomen 2/11 triple phase: cirrhotic liver with multiple masses suspicious for hepatocellular carcinoma  - AFP 36.8  - CEA 6.4  -NM bone scan with no evidence of osseous metastasis,  - as per heme onc, role of chemotherapy would be palliative, will follow up final plan and re: M spike ?MGUS newly diagnosed HCC, IR deemed not a candidate for local therapy and oncology consults pending for therapeutic options  - Ct chest 2/19 showing no intrapulmonary mets  MRI liver 2.18 -->Extensive bilobar hepatocellular neoplasm including infiltrative disease in segment 8, greater than 10 lesions in the right lobe and several lesions in the left hepatic lobe reference lesions as follows  CT abdomen : numerous hepatic masses. Largest 5.5x 10.9 right lobe.   [place] [facility] [place] abdomen 2/11 triple phase: cirrhotic liver with multiple masses suspicious for hepatocellular carcinoma  - AFP 36.8  - CEA 6.4  -NM bone scan with no evidence of osseous metastasis,  - as per heme onc, role of chemotherapy would be palliative, will follow up final plan and re: M spike--> IgA spike ?MGUS, serum free light chains pending

## 2020-02-21 NOTE — PROGRESS NOTE ADULT - PROBLEM SELECTOR PLAN 2
-  history of cirrhosis recently found to have rising bilirubin at [facility] and abdominal pain likely component of Alcoholic Hepatitis v HCC   - extensive work-up was done at [facility] regarding the etiology. likely multifactorial in the setting of hep b/c infections as well as alcoholic hepatitis  - appreciate hepatology c/s  - HEP C reactive Hep S Ab reactive, Hep B surface ag nonreactive, core nonreactive, Hep C RNA nonreactive, Hep A IgG reactive, Hep A IgM indertiminate  HIV nonreactive  IgA elevated, IgM normal, Ceruloplasmin 39, SPEP migrating B protein with M spike 1.2--> follow up heme onc and 24 hr urine collectiob  - T bili, LFT, Coag daily
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
## 2020-02-21 NOTE — PROGRESS NOTE ADULT - ASSESSMENT
64 yo non-domiciled M with cirrhosis secondary to alcohol and drug abuse and afib (not on AC) was transferred from [facility] on 2/14 for further work up of hepatic masses, imaging consistent with multifocal bilobar hepatocellular carcinoma, also found to have IgA monoclonal gammopathy    1. Multifocal HCC: Child Pickett B (8pts) at this time  - known to have liver masses since 2014 per review of records. Patient aware of them. Work up this current admission includes MRI abdomen that notes masses in both lobes and 3 notable left lobe masses (LI-RADS 5) consistent with HCC.  - diagnosis discussed with patient yesterday  -AFP 36.8  - CT chest with no intrathoracic mets, bone scan with no obvious osseous mets.   - has been evaluated by IR for local therapy and deemed not a candidate  - Systemic treatment is mainly based on child pickett score which is B8 which limits options. No plans for inpatient treatment, pt to be referred to [facility] after d/c for further management and discussion of systemic therapy.    2. IgA Lambda monoclonal gammopathy: Pt with IgA lambda band on serum KEISHA, SPEP with M-spike of 1.2 g/dL, quantitative IgA elevated at 2167.   - based on above results, pt with IgA MGUS, has macrocytic anemia but no hypercalcemia, TEZ, or lytic bone lesions.   - please check serum free light chains to complete work up and allow for risk stratification. Given non-IgG MGUS, pt at higher risk of progression and will need close monitoring  - outpatient f/u at [facility]    Pamela Raygoza, PGY-6  Hematology-Oncology Fellow  Pager: [phone] ([facility]), [phone] ([place])  Covering today only until 5pm 64 yo non-domiciled M with cirrhosis secondary to alcohol and drug abuse and afib (not on AC) was transferred from [facility] on 2/14 for further work up of hepatic masses, imaging consistent with multifocal bilobar hepatocellular carcinoma, also found to have IgA monoclonal gammopathy    1. Multifocal HCC: Child Pickett B (8pts) at this time  - known to have liver masses since 2014 per review of records. Patient aware of them. Work up this current admission includes MRI abdomen that notes masses in both lobes and 3 notable left lobe masses (LI-RADS 5) consistent with HCC.  - diagnosis discussed with patient yesterday  -AFP 36.8  - CT chest with no intrathoracic mets, bone scan with no obvious osseous mets.   - has been evaluated by IR for local therapy and deemed not a candidate  - Systemic treatment is mainly based on child pickett score which is B8 which limits options. No plans for inpatient treatment, pt to be referred to [facility] after d/c for further management and discussion of systemic therapy.    2. IgA Lambda monoclonal gammopathy: Pt with IgA lambda band on serum KEISHA, SPEP with M-spike of 1.2 g/dL, quantitative IgA elevated at 2167.   - based on above results, pt with IgA MGUS, has macrocytic anemia but no hypercalcemia, TEZ, or lytic bone lesions.   - please check serum free light chains to complete work up and allow for risk stratification. Given non-IgG MGUS, pt at higher risk of progression and will need close monitoring as outpatient    Pamela Raygoza, PGY-6  Hematology-Oncology Fellow  Pager: [phone] ([facility]), [phone] ([place])  Covering today only until 5pm 64 yo non-domiciled M with cirrhosis secondary to alcohol and drug abuse and afib (not on AC) was transferred from [facility] on 2/14 for further work up of hepatic masses, imaging consistent with multifocal bilobar hepatocellular carcinoma, also found to have IgA monoclonal gammopathy    1. Multifocal HCC: Child Pickett B (8pts) at this time  - known to have liver masses since 2014 per review of records. Patient aware of them. Work up this current admission includes MRI abdomen that notes masses in both lobes and 3 notable left lobe masses (LI-RADS 5) consistent with HCC.  - diagnosis discussed with patient yesterday  -AFP 36.8  - CT chest with no intrathoracic mets, bone scan with no obvious osseous mets.   - has been evaluated by IR for local therapy and deemed not a candidate  - Systemic treatment is mainly based on child pickett score which is B8 which limits options. No plans for inpatient treatment, pt to be referred to [facility] after d/c for further management and discussion of systemic therapy. Discussed with primary team if pt would be able to follow up as outpatient and stated that he should be able to follow up, pt has 8 children, and also has insurance (medicaid/medicare).     2. IgA Lambda monoclonal gammopathy: Pt with IgA lambda band on serum KEISHA, SPEP with M-spike of 1.2 g/dL, quantitative IgA elevated at 2167.   - based on above results, pt with IgA MGUS, has macrocytic anemia but no hypercalcemia, TEZ, or lytic bone lesions.   - please check serum free light chains to complete work up and allow for risk stratification. Given non-IgG MGUS, pt at higher risk of progression and will need close monitoring as outpatient    Pamela Raygoza, PGY-6  Hematology-Oncology Fellow  Pager: [phone] ([facility]), [phone] ([facility])  Covering today only until 5pm 66 yo non-domiciled M with cirrhosis secondary to alcohol and drug abuse and afib (not on AC) was transferred from [facility] on 2/14 for further work up of hepatic masses, imaging consistent with multifocal bilobar hepatocellular carcinoma, also found to have IgA monoclonal gammopathy    1. Multifocal HCC: Child Pickett B (8pts) at this time  - known to have liver masses since 2014 per review of records. Patient aware of them. Work up this current admission includes MRI abdomen that notes masses in both lobes and 3 notable left lobe masses (LI-RADS 5) consistent with HCC.  - diagnosis discussed with patient yesterday  -AFP 36.8  - CT chest with no intrathoracic mets, bone scan with no obvious osseous mets.   - has been evaluated by IR for local therapy and deemed not a candidate  - Systemic treatment is mainly based on child pickett score which is B8 and with hyperbilirubinemia treatment options are very limited at this time. Will refer to [facility] after discharge to establish care with Dr. Cardona and re-assess candidacy for systemic treatment vs palliative/hospice.   - Discussed with primary team if pt would be able to follow up as outpatient and stated that he should be able to follow up, pt has 8 children, and also has insurance (medicaid/medicare). Discussed outpatient follow up with patient today and he provided us the best number to contact him at is [phone] which belongs to his niece (Katie). Will make a referral to [facility] after pt is discharged.     2. IgA Lambda monoclonal gammopathy: Pt with IgA lambda band on serum KEISHA, SPEP with M-spike of 1.2 g/dL, quantitative IgA elevated at 2167.   - based on above results, pt with IgA MGUS, has macrocytic anemia but no hypercalcemia, TEZ, or lytic bone lesions.   - please check serum free light chains to complete work up and allow for risk stratification. Given non-IgG MGUS, pt at higher risk of progression and will need close monitoring as outpatient    Pamela Raygoza, PGY-6  Hematology-Oncology Fellow  Pager: [phone] ([facility]), [phone] ([facility])  Covering today only until 5pm

## 2020-02-21 NOTE — PROGRESS NOTE ADULT - SUBJECTIVE AND OBJECTIVE BOX
INTERVAL HPI/OVERNIGHT EVENTS:  Patient S&E at bedside. No o/n events. Primary team asked for follow up regarding final recommendations for management for HCC prior to d/c and to comment on findings of IgA lambda monoclonal gammopathy. Pt with no complaints.     MEDICATIONS  (STANDING):  enoxaparin Injectable 40 milliGRAM(s) SubCutaneous daily  folic acid 1 milliGRAM(s) Oral daily  lactulose Syrup 20 Gram(s) Oral three times a day  melatonin 6 milliGRAM(s) Oral at bedtime  metoprolol tartrate 25 milliGRAM(s) Oral two times a day  mirtazapine 15 milliGRAM(s) Oral at bedtime  multivitamin 1 Tablet(s) Oral daily  pantoprazole    Tablet 40 milliGRAM(s) Oral before breakfast  rifAXIMin 550 milliGRAM(s) Oral two times a day  thiamine 100 milliGRAM(s) Oral daily    MEDICATIONS  (PRN):    Allergies    Allergy Status Unknown    Intolerances  VITAL SIGNS:  T(F): 98.1 (02-21-20 @ 04:59)  HR: 74 (02-21-20 @ 06:32)  BP: 148/80 (02-21-20 @ 06:32)  RR: 18 (02-21-20 @ 04:59)  SpO2: 98% (02-21-20 @ 04:59)  Wt(kg): --    PHYSICAL EXAM:  Constitutional: NAD  Eyes: EOMI, mild scleral icterus  Neck: supple  Respiratory: CTAB  Cardiovascular: RRR, S1/S2  Gastrointestinal: +BS, soft, mildly distended, NT  Extremities: no LE edema  Neurological: AAOx3    LABS:                        10.1   9.03  )-----------( 318      ( 21 Feb 2020 07:41 )             30.5     02-21    136  |  100  |  18  ----------------------------<  91  3.7   |  23  |  0.67    Ca    8.9      21 Feb 2020 07:41    TPro  7.9  /  Alb  2.8<L>  /  TBili  2.8<H>  /  DBili  x   /  AST  144<H>  /  ALT  87<H>  /  AlkPhos  238<H>  02-21    PT/INR - ( 21 Feb 2020 10:01 )   PT: 17.0 sec;   INR: 1.49 ratio    PTT - ( 21 Feb 2020 10:01 )  PTT:37.1 sec    RADIOLOGY & ADDITIONAL TESTS:  Studies reviewed.

## 2020-02-21 NOTE — PROGRESS NOTE ADULT - SUBJECTIVE AND OBJECTIVE BOX
Patient is a 65y old  Male who presents with a chief complaint of Cirrhosis with likely liver malignancy (20 Feb 2020 11:50)      SUBJECTIVE / OVERNIGHT EVENTS:  no interval events  MEDICATIONS  (STANDING):  enoxaparin Injectable 40 milliGRAM(s) SubCutaneous daily  folic acid 1 milliGRAM(s) Oral daily  lactulose Syrup 20 Gram(s) Oral three times a day  melatonin 6 milliGRAM(s) Oral at bedtime  metoprolol tartrate 25 milliGRAM(s) Oral two times a day  mirtazapine 15 milliGRAM(s) Oral at bedtime  multivitamin 1 Tablet(s) Oral daily  pantoprazole    Tablet 40 milliGRAM(s) Oral before breakfast  rifAXIMin 550 milliGRAM(s) Oral two times a day  thiamine 100 milliGRAM(s) Oral daily    MEDICATIONS  (PRN):              PHYSICAL EXAM:  GENERAL: NAD, well-developed  HEAD:  Atraumatic, Normocephalic  EYES: EOMI, PERRLA, conjunctiva and sclera anicteric  NECK: Supple, No JVD  CHEST/LUNG: Clear to auscultation bilaterally; No wheeze  HEART: Regular rate and rhythm; No murmurs, rubs, or gallops  ABDOMEN: Soft, Nontender, Nondistended; Bowel sounds present, no hepatosplenomegaly, no rebound or guarding  EXTREMITIES:  2+ Peripheral Pulses, No clubbing, cyanosis, or edema    NEUROLOGY: non-focal, no asterixis  SKIN: No rashes or lesion    LABS:                        10.1   9.03  )-----------( 318      ( 21 Feb 2020 07:41 )             30.5     02-21    136  |  100  |  18  ----------------------------<  91  3.7   |  23  |  0.67    Ca    8.9      21 Feb 2020 07:41    TPro  7.9  /  Alb  2.8<L>  /  TBili  2.8<H>  /  DBili  x   /  AST  144<H>  /  ALT  87<H>  /  AlkPhos  238<H>  02-21    LIVER FUNCTIONS - ( 21 Feb 2020 07:41 )  Alb: 2.8 g/dL / Pro: 7.9 g/dL / ALK PHOS: 238 U/L / ALT: 87 U/L / AST: 144 U/L / GGT: x           PT/INR - ( 20 Feb 2020 08:31 )   PT: 17.6 sec;   INR: 1.54 ratio         PTT - ( 20 Feb 2020 08:31 )  PTT:37.0 sec          RADIOLOGY & ADDITIONAL TESTS: Patient is a 65y old  Male who presents with a chief complaint of Cirrhosis with likely liver malignancy (20 Feb 2020 11:50)      SUBJECTIVE / OVERNIGHT EVENTS:  no interval events  denies fevers chills diarrhea chest pain dyspnea  MEDICATIONS  (STANDING):  enoxaparin Injectable 40 milliGRAM(s) SubCutaneous daily  folic acid 1 milliGRAM(s) Oral daily  lactulose Syrup 20 Gram(s) Oral three times a day  melatonin 6 milliGRAM(s) Oral at bedtime  metoprolol tartrate 25 milliGRAM(s) Oral two times a day  mirtazapine 15 milliGRAM(s) Oral at bedtime  multivitamin 1 Tablet(s) Oral daily  pantoprazole    Tablet 40 milliGRAM(s) Oral before breakfast  rifAXIMin 550 milliGRAM(s) Oral two times a day  thiamine 100 milliGRAM(s) Oral daily    MEDICATIONS  (PRN):              PHYSICAL EXAM:  GENERAL: NAD, well-developed  HEAD:  Atraumatic, Normocephalic  EYES: EOMI, PERRLA, conjunctiva and sclera anicteric  NECK: Supple, No JVD  CHEST/LUNG: Clear to auscultation bilaterally; No wheeze  HEART: Regular rate and rhythm; No murmurs, rubs, or gallops  ABDOMEN: Soft, Nontender, Nondistended; Bowel sounds present, no hepatosplenomegaly, no rebound or guarding  EXTREMITIES:  2+ Peripheral Pulses, No clubbing, cyanosis, or edema    NEUROLOGY: non-focal, no asterixis  SKIN: No rashes or lesion    LABS:                        10.1   9.03  )-----------( 318      ( 21 Feb 2020 07:41 )             30.5     02-21    136  |  100  |  18  ----------------------------<  91  3.7   |  23  |  0.67    Ca    8.9      21 Feb 2020 07:41    TPro  7.9  /  Alb  2.8<L>  /  TBili  2.8<H>  /  DBili  x   /  AST  144<H>  /  ALT  87<H>  /  AlkPhos  238<H>  02-21    LIVER FUNCTIONS - ( 21 Feb 2020 07:41 )  Alb: 2.8 g/dL / Pro: 7.9 g/dL / ALK PHOS: 238 U/L / ALT: 87 U/L / AST: 144 U/L / GGT: x           PT/INR - ( 20 Feb 2020 08:31 )   PT: 17.6 sec;   INR: 1.54 ratio         PTT - ( 20 Feb 2020 08:31 )  PTT:37.0 sec          RADIOLOGY & ADDITIONAL TESTS:

## 2020-02-21 NOTE — PROGRESS NOTE ADULT - ASSESSMENT
65 year old man with medical history of A fib not on anticoagulation, h/o subdural hematoma, Alcohol abuse , Cirrhosis, hepatitis C presented to [facility] as a transfer from [facility] for further management of his liver mass.  Records reviewed.   On 7/2014 CT showed 1st vertebral body compression fracture. CT abdomen: hypodense lesion 6.5 cm in right hepatic lobe.   07/2014 Hep C serology positive   07.2014 AFP: 23. CEA: 6,18    05/2019:  CT abdomen: Right hepatic lobe lesion 5.3 x 5.7 x 6.1 cm and multiple other lesions. Lymphadenopathy  05/2019 Colonoscopy and EGD: colonic diverticulosis. No varices reported.    02/2020 Admitted to [facility] for abdominal pain. CT abdomen : numerous hepatic masses. Largest 5.5x 10.9 right lobe.   Ethanol level: 236. CT guided biopsy planned on 02/07 but failed due to technical difficulty.   Urine tox positive for cocaine   CT abdomen 2/11 triple phase: cirrhotic liver with multiple masses suspicious for hepatocellular carcinoma  AFP: 34.7   CEA: 6.52  Hepatitis B core IgG and Hepatitis C Ab are reactive.      Impression:  # Multifocal HCC, CT chest and bone scan negative  # Hepatitis C exposure   # Alcohol and cocaine abuse   # A fib  # Elevated IgA    Recommendations:   -appreciate onc reccs. Will sign off. 65 year old man with medical history of A fib not on anticoagulation, h/o subdural hematoma, Alcohol abuse , Cirrhosis, hepatitis C presented to [facility] as a transfer from [facility] for further management of his liver mass.  Records reviewed.   On 7/2014 CT showed 1st vertebral body compression fracture. CT abdomen: hypodense lesion 6.5 cm in right hepatic lobe.   07/2014 Hep C serology positive   07.2014 AFP: 23. CEA: 6,18    05/2019:  CT abdomen: Right hepatic lobe lesion 5.3 x 5.7 x 6.1 cm and multiple other lesions. Lymphadenopathy  05/2019 Colonoscopy and EGD: colonic diverticulosis. No varices reported.    02/2020 Admitted to [facility] for abdominal pain. CT abdomen : numerous hepatic masses. Largest 5.5x 10.9 right lobe.   Ethanol level: 236. CT guided biopsy planned on 02/07 but failed due to technical difficulty.   Urine tox positive for cocaine   CT abdomen 2/11 triple phase: cirrhotic liver with multiple masses suspicious for hepatocellular carcinoma  AFP: 34.7   CEA: 6.52  Hepatitis B core IgG and Hepatitis C Ab are reactive.      Impression:  # Multifocal HCC, CT chest and bone scan negative  # Possible monoclonal gammopathy  # Hepatitis C exposure   # Alcohol and cocaine abuse   # A fib  # Elevated IgA    Recommendations:   -appreciate onc reccs. Will sign off, call w questions  -would appreciate onc input on possible monoclonal gammopathy 65 year old man with medical history of A fib not on anticoagulation, h/o subdural hematoma, Alcohol abuse , Cirrhosis, hepatitis C presented to [facility] as a transfer from [facility] for further management of his liver mass.  Records reviewed.   On 7/2014 CT showed 1st vertebral body compression fracture. CT abdomen: hypodense lesion 6.5 cm in right hepatic lobe.   07/2014 Hep C serology positive   07.2014 AFP: 23. CEA: 6,18    05/2019:  CT abdomen: Right hepatic lobe lesion 5.3 x 5.7 x 6.1 cm and multiple other lesions. Lymphadenopathy  05/2019 Colonoscopy and EGD: colonic diverticulosis. No varices reported.    02/2020 Admitted to [facility] for abdominal pain. CT abdomen : numerous hepatic masses. Largest 5.5x 10.9 right lobe.   Ethanol level: 236. CT guided biopsy planned on 02/07 but failed due to technical difficulty.   Urine tox positive for cocaine   CT abdomen 2/11 triple phase: cirrhotic liver with multiple masses suspicious for hepatocellular carcinoma  AFP: 34.7   CEA: 6.52  Hepatitis B core IgG and Hepatitis C Ab are reactive.      Impression:  # Multifocal HCC, CT chest and bone scan negative  # Possible monoclonal gammopathy  # Hepatitis C exposure   # Alcohol and cocaine abuse   # A fib  # Elevated IgA    Recommendations:   -appreciate onc reccs. Will sign off, reconsult if needed   -would appreciate onc input on possible monoclonal gammopathy/ MGUS

## 2020-02-21 NOTE — PROGRESS NOTE ADULT - PROBLEM SELECTOR PLAN 8
DVT prophylaxis: lovenox (SDH was year ago, CT from [facility] from January is stable).    Social work consult--> non- domiciled

## 2020-02-21 NOTE — PROGRESS NOTE ADULT - ASSESSMENT
The patient is a 65 year old non-domisciled man with a history if cirrhosis secondary to alcohol and drug abuse, subdural hemorrhage, afib (not on AC) is transferred from [facility] for further work up of hepatic masses now confirmed as extensive HCC

## 2020-02-21 NOTE — CHART NOTE - NSCHARTNOTEFT_GEN_A_CORE
Nutrition follow up     Pt seen for malnutrition follow up. Dietitian Initial Evaluation competed yesterday (02/20).     Chart, medications, and labs reviewed. Pt disoriented to situation as per documentation - confirmed information with RN. Pt reports good appetite but decreased PO intake due to "not receiving food preferences"; denies filling out menu - provided assistance at this time. Denies drinking Ensure Enlive 2xday, but states will drink it today. Denies difficulty chewing/swallowing. Pt denies nausea, vomiting, diarrhea, or constipation, last BM today (02/21). Continue Multivitamin, Vitamin B1, and Folic Acid to optimize nutrient intake. Encourage PO intake and provide assistance with menu - spoke to RN. Reviewed recommendations to optimize PO and protein intake; pt denies having questions/concerns about diet and nutrition.     Will continue to monitor PO intake, weight, labs, skin, GI status, diet.     RD remains available.   Bernie Doll MS RDN CDN #[phone]

## 2020-02-22 LAB
A1ACID GLYCOPROTEIN FLD-MCNC: ABNORMAL
CULTURE RESULTS: SIGNIFICANT CHANGE UP
CULTURE RESULTS: SIGNIFICANT CHANGE UP
SPECIMEN SOURCE: SIGNIFICANT CHANGE UP
SPECIMEN SOURCE: SIGNIFICANT CHANGE UP

## 2020-02-22 PROCEDURE — 99233 SBSQ HOSP IP/OBS HIGH 50: CPT

## 2020-02-22 RX ADMIN — PANTOPRAZOLE SODIUM 40 MILLIGRAM(S): 20 TABLET, DELAYED RELEASE ORAL at 06:03

## 2020-02-22 RX ADMIN — MIRTAZAPINE 15 MILLIGRAM(S): 45 TABLET, ORALLY DISINTEGRATING ORAL at 23:14

## 2020-02-22 RX ADMIN — LACTULOSE 20 GRAM(S): 10 SOLUTION ORAL at 23:14

## 2020-02-22 RX ADMIN — LACTULOSE 20 GRAM(S): 10 SOLUTION ORAL at 13:33

## 2020-02-22 RX ADMIN — LACTULOSE 20 GRAM(S): 10 SOLUTION ORAL at 06:03

## 2020-02-22 RX ADMIN — Medication 25 MILLIGRAM(S): at 06:03

## 2020-02-22 RX ADMIN — Medication 6 MILLIGRAM(S): at 23:14

## 2020-02-22 RX ADMIN — Medication 100 MILLIGRAM(S): at 12:03

## 2020-02-22 RX ADMIN — Medication 1 TABLET(S): at 12:03

## 2020-02-22 RX ADMIN — Medication 1 MILLIGRAM(S): at 12:03

## 2020-02-22 RX ADMIN — Medication 25 MILLIGRAM(S): at 18:07

## 2020-02-22 NOTE — PROGRESS NOTE ADULT - PROBLEM SELECTOR PLAN 8
DVT prophylaxis: lovenox (SDH was year ago, CT from [facility] from January is stable).    Social work consult--> non- domiciled

## 2020-02-22 NOTE — PROGRESS NOTE ADULT - PROBLEM SELECTOR PLAN 2
-  history of cirrhosis recently found to have rising bilirubin at [facility] and abdominal pain likely component of Alcoholic Hepatitis v HCC   - extensive work-up was done at [facility] regarding the etiology. Likely multifactorial in the setting of hep b/c infections as well as alcoholic hepatitis  - appreciate hepatology c/s  - HEP C reactive Hep S Ab reactive, Hep B surface ag nonreactive, core nonreactive, Hep C RNA nonreactive, Hep A IgG reactive, Hep A IgM indeterminate  HIV nonreactive  IgA elevated, IgM normal, Ceruloplasmin 39, SPEP migrating B protein with M spike 1.2

## 2020-02-22 NOTE — PROGRESS NOTE ADULT - PROBLEM SELECTOR PLAN 1
newly diagnosed HCC, IR deemed not a candidate for local therapy and oncology consults pending for therapeutic options  - Ct chest 2/19 showing no intrapulmonary mets  MRI liver 2.18 -->Extensive bilobar hepatocellular neoplasm including infiltrative disease in segment 8, greater than 10 lesions in the right lobe and several lesions in the left hepatic lobe reference lesions as follows  CT abdomen : numerous hepatic masses. Largest 5.5x 10.9 right lobe.   [place] [facility] abdomen 2/11 triple phase: cirrhotic liver with multiple masses suspicious for hepatocellular carcinoma  - AFP 36.8  - CEA 6.4  -NM bone scan with no evidence of osseous metastasis,  - as per heme onc, role of chemotherapy would be palliative, will follow up final plan and re: M spike--> IgA spike ?MGUS, serum free light chains pending but pt refusing blood at this time.

## 2020-02-22 NOTE — PROGRESS NOTE ADULT - SUBJECTIVE AND OBJECTIVE BOX
PROGRESS NOTE:     Patient is a 65y old  Male who presents with a chief complaint of Cirrhosis with likely liver malignancy (21 Feb 2020 12:39)      SUBJECTIVE / OVERNIGHT EVENTS: Pt very agitated about having to stay and have his blood drawn.  He says no one has spoken to him about what's going on however when asked what his understanding is pt is able to say he has liver cancer that is not curable but able to have therapy that my slow the disease.  Explained to him he needs a stable home discharge plan to be able to follow up for treatment options. He seems to understand but continues to express frustration.     ADDITIONAL REVIEW OF SYSTEMS:  No fevers or chills  No n/v/d  MEDICATIONS  (STANDING):  enoxaparin Injectable 40 milliGRAM(s) SubCutaneous daily  folic acid 1 milliGRAM(s) Oral daily  lactulose Syrup 20 Gram(s) Oral three times a day  melatonin 6 milliGRAM(s) Oral at bedtime  metoprolol tartrate 25 milliGRAM(s) Oral two times a day  mirtazapine 15 milliGRAM(s) Oral at bedtime  multivitamin 1 Tablet(s) Oral daily  pantoprazole    Tablet 40 milliGRAM(s) Oral before breakfast  rifAXIMin 550 milliGRAM(s) Oral two times a day  thiamine 100 milliGRAM(s) Oral daily    MEDICATIONS  (PRN):      CAPILLARY BLOOD GLUCOSE        I&O's Summary    21 Feb 2020 07:01  -  22 Feb 2020 07:00  --------------------------------------------------------  IN: 240 mL / OUT: 0 mL / NET: 240 mL        PHYSICAL EXAM:  Vital Signs Last 24 Hrs  T(C): 36.9 (22 Feb 2020 04:51), Max: 36.9 (22 Feb 2020 04:51)  T(F): 98.4 (22 Feb 2020 04:51), Max: 98.4 (22 Feb 2020 04:51)  HR: 86 (22 Feb 2020 04:51) (71 - 86)  BP: 119/76 (22 Feb 2020 04:51) (114/71 - 148/89)  BP(mean): --  RR: 18 (22 Feb 2020 04:51) (18 - 18)  SpO2: 96% (22 Feb 2020 04:51) (96% - 97%)    CONSTITUTIONAL: NAD, well-developed, non toxic appearing  RESPIRATORY: Normal respiratory effort; lungs are clear to auscultation bilaterally  CARDIOVASCULAR: Regular rate and rhythm, normal S1 and S2, no murmur/rub/gallop; No lower extremity edema; Peripheral pulses are 2+ bilaterally  ABDOMEN: Nontender to palpation, normoactive bowel sounds, no rebound/guarding; No hepatosplenomegaly  MUSCLOSKELETAL: no clubbing or cyanosis of digits; no joint swelling or tenderness to palpation  PSYCH: A+O to person, place, and time; affect appropriate    LABS:                        10.1   9.03  )-----------( 318      ( 21 Feb 2020 07:41 )             30.5     02-21    136  |  100  |  18  ----------------------------<  91  3.7   |  23  |  0.67    Ca    8.9      21 Feb 2020 07:41    TPro  7.9  /  Alb  2.8<L>  /  TBili  2.8<H>  /  DBili  x   /  AST  144<H>  /  ALT  87<H>  /  AlkPhos  238<H>  02-21    PT/INR - ( 21 Feb 2020 10:01 )   PT: 17.0 sec;   INR: 1.49 ratio         PTT - ( 21 Feb 2020 10:01 )  PTT:37.1 sec            RADIOLOGY & ADDITIONAL TESTS:  Results Reviewed:   Imaging Personally Reviewed:  Electrocardiogram Personally Reviewed:    COORDINATION OF CARE:  Care Discussed with Consultants/Other Providers [Y/N]:  Prior or Outpatient Records Reviewed [Y/N]:

## 2020-02-22 NOTE — PROGRESS NOTE ADULT - ASSESSMENT
The patient is a 65 year old non-domisciled man with a history if cirrhosis secondary to alcohol and drug abuse, subdural hemorrhage, afib (not on AC) is transferred from [facility] for further work up of hepatic masses now confirmed as extensive HCC

## 2020-02-23 LAB
ALBUMIN SERPL ELPH-MCNC: 2.8 G/DL — LOW (ref 3.3–5)
ALP SERPL-CCNC: 251 U/L — HIGH (ref 40–120)
ALT FLD-CCNC: 78 U/L — HIGH (ref 10–45)
ANION GAP SERPL CALC-SCNC: 14 MMOL/L — SIGNIFICANT CHANGE UP (ref 5–17)
APTT BLD: 37.3 SEC — HIGH (ref 27.5–36.3)
AST SERPL-CCNC: 122 U/L — HIGH (ref 10–40)
BILIRUB SERPL-MCNC: 2.3 MG/DL — HIGH (ref 0.2–1.2)
BUN SERPL-MCNC: 23 MG/DL — SIGNIFICANT CHANGE UP (ref 7–23)
CALCIUM SERPL-MCNC: 9.3 MG/DL — SIGNIFICANT CHANGE UP (ref 8.4–10.5)
CHLORIDE SERPL-SCNC: 104 MMOL/L — SIGNIFICANT CHANGE UP (ref 96–108)
CO2 SERPL-SCNC: 20 MMOL/L — LOW (ref 22–31)
CREAT SERPL-MCNC: 0.74 MG/DL — SIGNIFICANT CHANGE UP (ref 0.5–1.3)
GLUCOSE SERPL-MCNC: 96 MG/DL — SIGNIFICANT CHANGE UP (ref 70–99)
HCT VFR BLD CALC: 30.6 % — LOW (ref 39–50)
HGB BLD-MCNC: 10.2 G/DL — LOW (ref 13–17)
INR BLD: 1.49 RATIO — HIGH (ref 0.88–1.16)
MCHC RBC-ENTMCNC: 33.3 GM/DL — SIGNIFICANT CHANGE UP (ref 32–36)
MCHC RBC-ENTMCNC: 36.4 PG — HIGH (ref 27–34)
MCV RBC AUTO: 109.3 FL — HIGH (ref 80–100)
NRBC # BLD: 0 /100 WBCS — SIGNIFICANT CHANGE UP (ref 0–0)
PLATELET # BLD AUTO: 284 K/UL — SIGNIFICANT CHANGE UP (ref 150–400)
POTASSIUM SERPL-MCNC: 4.2 MMOL/L — SIGNIFICANT CHANGE UP (ref 3.5–5.3)
POTASSIUM SERPL-SCNC: 4.2 MMOL/L — SIGNIFICANT CHANGE UP (ref 3.5–5.3)
PROT SERPL-MCNC: 7.9 G/DL — SIGNIFICANT CHANGE UP (ref 6–8.3)
PROTHROM AB SERPL-ACNC: 17.3 SEC — HIGH (ref 10–13.1)
RBC # BLD: 2.8 M/UL — LOW (ref 4.2–5.8)
RBC # FLD: 15.3 % — HIGH (ref 10.3–14.5)
SODIUM SERPL-SCNC: 138 MMOL/L — SIGNIFICANT CHANGE UP (ref 135–145)
WBC # BLD: 10.11 K/UL — SIGNIFICANT CHANGE UP (ref 3.8–10.5)
WBC # FLD AUTO: 10.11 K/UL — SIGNIFICANT CHANGE UP (ref 3.8–10.5)

## 2020-02-23 PROCEDURE — 99232 SBSQ HOSP IP/OBS MODERATE 35: CPT

## 2020-02-23 RX ADMIN — Medication 25 MILLIGRAM(S): at 17:52

## 2020-02-23 RX ADMIN — Medication 6 MILLIGRAM(S): at 21:06

## 2020-02-23 RX ADMIN — LACTULOSE 20 GRAM(S): 10 SOLUTION ORAL at 06:47

## 2020-02-23 RX ADMIN — Medication 1 MILLIGRAM(S): at 11:53

## 2020-02-23 RX ADMIN — MIRTAZAPINE 15 MILLIGRAM(S): 45 TABLET, ORALLY DISINTEGRATING ORAL at 21:06

## 2020-02-23 RX ADMIN — Medication 1 TABLET(S): at 11:53

## 2020-02-23 RX ADMIN — LACTULOSE 20 GRAM(S): 10 SOLUTION ORAL at 21:06

## 2020-02-23 RX ADMIN — LACTULOSE 20 GRAM(S): 10 SOLUTION ORAL at 17:52

## 2020-02-23 RX ADMIN — Medication 100 MILLIGRAM(S): at 11:53

## 2020-02-23 RX ADMIN — PANTOPRAZOLE SODIUM 40 MILLIGRAM(S): 20 TABLET, DELAYED RELEASE ORAL at 06:46

## 2020-02-23 RX ADMIN — Medication 25 MILLIGRAM(S): at 06:46

## 2020-02-23 NOTE — PROGRESS NOTE ADULT - PROBLEM SELECTOR PLAN 8
DVT prophylaxis: lovenox (SDH was year ago, CT from [facility] from January is stable).    Social work consult--> non- domiciled

## 2020-02-23 NOTE — PROGRESS NOTE ADULT - SUBJECTIVE AND OBJECTIVE BOX
PROGRESS NOTE:     Patient is a 65y old  Male who presents with a chief complaint of Cirrhosis with likely liver malignancy (22 Feb 2020 13:41)      SUBJECTIVE / OVERNIGHT EVENTS: GIGI    ADDITIONAL REVIEW OF SYSTEMS:  No fevers or chills  No n/v/d    MEDICATIONS  (STANDING):  enoxaparin Injectable 40 milliGRAM(s) SubCutaneous daily  folic acid 1 milliGRAM(s) Oral daily  lactulose Syrup 20 Gram(s) Oral three times a day  melatonin 6 milliGRAM(s) Oral at bedtime  metoprolol tartrate 25 milliGRAM(s) Oral two times a day  mirtazapine 15 milliGRAM(s) Oral at bedtime  multivitamin 1 Tablet(s) Oral daily  pantoprazole    Tablet 40 milliGRAM(s) Oral before breakfast  rifAXIMin 550 milliGRAM(s) Oral two times a day  thiamine 100 milliGRAM(s) Oral daily    MEDICATIONS  (PRN):      CAPILLARY BLOOD GLUCOSE        I&O's Summary    22 Feb 2020 07:01  -  23 Feb 2020 07:00  --------------------------------------------------------  IN: 480 mL / OUT: 0 mL / NET: 480 mL        PHYSICAL EXAM:  Vital Signs Last 24 Hrs  T(C): 36.7 (23 Feb 2020 11:55), Max: 37.1 (22 Feb 2020 21:04)  T(F): 98.1 (23 Feb 2020 11:55), Max: 98.7 (22 Feb 2020 21:04)  HR: 71 (23 Feb 2020 11:55) (71 - 79)  BP: 123/79 (23 Feb 2020 11:55) (108/66 - 136/83)  BP(mean): --  RR: 18 (23 Feb 2020 11:55) (18 - 18)  SpO2: 99% (23 Feb 2020 11:55) (96% - 99%)    CONSTITUTIONAL: NAD, well-developed, non toxic appearing  RESPIRATORY: Normal respiratory effort; lungs are clear to auscultation bilaterally  CARDIOVASCULAR: Regular rate and rhythm, normal S1 and S2, no murmur/rub/gallop; No lower extremity edema; Peripheral pulses are 2+ bilaterally  ABDOMEN: Nontender to palpation, normoactive bowel sounds, no rebound/guarding; No hepatosplenomegaly  MUSCLOSKELETAL: no clubbing or cyanosis of digits; no joint swelling or tenderness to palpation  PSYCH: A+O to person, place, and time; affect appropriate    LABS:                        10.2   10.11 )-----------( 284      ( 23 Feb 2020 11:53 )             30.6     02-23    138  |  104  |  23  ----------------------------<  96  4.2   |  20<L>  |  0.74    Ca    9.3      23 Feb 2020 11:53    TPro  7.9  /  Alb  2.8<L>  /  TBili  2.3<H>  /  DBili  x   /  AST  122<H>  /  ALT  78<H>  /  AlkPhos  251<H>  02-23                RADIOLOGY & ADDITIONAL TESTS:  Results Reviewed:   Imaging Personally Reviewed:  Electrocardiogram Personally Reviewed:    COORDINATION OF CARE:  Care Discussed with Consultants/Other Providers [Y/N]:  Prior or Outpatient Records Reviewed [Y/N]:

## 2020-02-23 NOTE — PROGRESS NOTE ADULT - PROBLEM SELECTOR PLAN 2
-  history of cirrhosis recently found to have rising bilirubin at [facility] and abdominal pain likely component of Alcoholic Hepatitis v HCC   - extensive work-up was done at [facility] regarding the etiology. Likely multifactorial in the setting of hep b/c infections as well as alcoholic hepatitis  - appreciate hepatology c/s  - HEP C reactive Hep S Ab reactive, Hep B surface ag nonreactive, core nonreactive, Hep C RNA nonreactive, Hep A IgG reactive, Hep A IgM indeterminate  HIV nonreactive  IgA elevated, IgM normal, Ceruloplasmin 39, SPEP migrating B protein with M spike 1.2

## 2020-02-23 NOTE — PROGRESS NOTE ADULT - PROBLEM SELECTOR PLAN 1
newly diagnosed HCC, IR deemed not a candidate for local therapy and oncology consults pending for therapeutic options  - Ct chest 2/19 showing no intrapulmonary mets  MRI liver 2.18 -->Extensive bilobar hepatocellular neoplasm including infiltrative disease in segment 8, greater than 10 lesions in the right lobe and several lesions in the left hepatic lobe reference lesions as follows  CT abdomen : numerous hepatic masses. Largest 5.5x 10.9 right lobe.   at [facility]-->[place] abdomen 2/11 triple phase: cirrhotic liver with multiple masses suspicious for hepatocellular carcinoma  - AFP 36.8  - CEA 6.4  -NM bone scan with no evidence of osseous metastasis,  - as per heme onc, role of chemotherapy would be palliative, will follow up final plan and re: M spike--> IgA spike ?MGUS, serum free light chains

## 2020-02-23 NOTE — PROGRESS NOTE ADULT - ASSESSMENT
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 for further work up of hepatic masses now confirmed as extensive HCC

## 2020-02-24 DIAGNOSIS — Z71.89 OTHER SPECIFIED COUNSELING: ICD-10-CM

## 2020-02-24 LAB
KAPPA LC SER QL IFE: 3.89 MG/DL — HIGH (ref 0.33–1.94)
KAPPA/LAMBDA FREE LIGHT CHAIN RATIO, SERUM: 0.25 RATIO — LOW (ref 0.26–1.65)
LAMBDA LC SER QL IFE: 15.54 MG/DL — HIGH (ref 0.57–2.63)

## 2020-02-24 PROCEDURE — 99232 SBSQ HOSP IP/OBS MODERATE 35: CPT

## 2020-02-24 RX ADMIN — LACTULOSE 20 GRAM(S): 10 SOLUTION ORAL at 06:36

## 2020-02-24 RX ADMIN — ENOXAPARIN SODIUM 40 MILLIGRAM(S): 100 INJECTION SUBCUTANEOUS at 12:13

## 2020-02-24 RX ADMIN — Medication 25 MILLIGRAM(S): at 18:23

## 2020-02-24 RX ADMIN — Medication 100 MILLIGRAM(S): at 12:13

## 2020-02-24 RX ADMIN — PANTOPRAZOLE SODIUM 40 MILLIGRAM(S): 20 TABLET, DELAYED RELEASE ORAL at 06:36

## 2020-02-24 RX ADMIN — Medication 1 MILLIGRAM(S): at 12:13

## 2020-02-24 RX ADMIN — Medication 1 TABLET(S): at 12:13

## 2020-02-24 RX ADMIN — LACTULOSE 20 GRAM(S): 10 SOLUTION ORAL at 21:40

## 2020-02-24 RX ADMIN — Medication 25 MILLIGRAM(S): at 06:36

## 2020-02-24 RX ADMIN — MIRTAZAPINE 15 MILLIGRAM(S): 45 TABLET, ORALLY DISINTEGRATING ORAL at 21:40

## 2020-02-24 RX ADMIN — Medication 6 MILLIGRAM(S): at 21:40

## 2020-02-24 NOTE — PROGRESS NOTE ADULT - PROBLEM SELECTOR PLAN 1
newly diagnosed HCC, IR deemed not a candidate for local therapy and oncology consults pending for therapeutic options  - Ct chest 2/19 showing no intrapulmonary mets  MRI liver 2.18 -->Extensive bilobar hepatocellular neoplasm including infiltrative disease in segment 8, greater than 10 lesions in the right lobe and several lesions in the left hepatic lobe reference lesions as follows  CT abdomen : numerous hepatic masses. Largest 5.5x 10.9 right lobe.   at [facility]-->CT abdomen 2/11 triple phase: cirrhotic liver with multiple masses suspicious for hepatocellular carcinoma  - AFP 36.8  - CEA 6.4  -NM bone scan with no evidence of osseous metastasis,  - as per heme onc, role of chemotherapy would be palliative, will follow up final plan and re: M spike--> IgA spike ?MGUS, serum free light chains

## 2020-02-24 NOTE — DISCHARGE NOTE NURSING/CASE MANAGEMENT/SOCIAL WORK - NSDCPEEMAIL_GEN_ALL_CORE
[facility] for Tobacco Control email [email or web address]

## 2020-02-24 NOTE — PROGRESS NOTE ADULT - PROBLEM SELECTOR PLAN 2
-  history of cirrhosis recently found to have rising bilirubin at [facility] and abdominal pain likely component of Alcoholic Hepatitis v HCC   - extensive work-up was done at [facility] regarding the etiology. Likely multifactorial in the setting of hep b/c infections as well as alcoholic hepatitis  - appreciate hepatology c/s  - HEP C reactive Hep S Ab reactive, Hep B surface ag nonreactive, core nonreactive, Hep C RNA nonreactive, Hep A IgG reactive, Hep A IgM indeterminate  HIV nonreactive  IgA elevated, IgM normal, Ceruloplasmin 39, SPEP migrating B protein with M spike 1.2

## 2020-02-24 NOTE — PROGRESS NOTE ADULT - PROBLEM SELECTOR PLAN 8
DVT prophylaxis: lovenox (SDH was year ago, CT from [facility] from January is stable).    Social work consult--> non- domiciled

## 2020-02-24 NOTE — DISCHARGE NOTE NURSING/CASE MANAGEMENT/SOCIAL WORK - NSDCPEWEB_GEN_ALL_CORE
[facility] for Tobacco Control website --- [email or web address] website --- [email or web address]

## 2020-02-24 NOTE — DISCHARGE NOTE NURSING/CASE MANAGEMENT/SOCIAL WORK - PATIENT PORTAL LINK FT
You can access the FollowMyHealth Patient Portal offered by [facility] by registering at the following website: [email or web address]. By joining [facility]’s FollowMyHealth portal, you will also be able to view your health information using other applications (apps) compatible with our system.

## 2020-02-24 NOTE — PROGRESS NOTE ADULT - SUBJECTIVE AND OBJECTIVE BOX
PROGRESS NOTE:     Patient is a 65y old  Male who presents with a chief complaint of Cirrhosis with likely liver malignancy (23 Feb 2020 12:43)      SUBJECTIVE / OVERNIGHT EVENTS: GIGI    ADDITIONAL REVIEW OF SYSTEMS:  no fevers or chills  No n/v/d    MEDICATIONS  (STANDING):  enoxaparin Injectable 40 milliGRAM(s) SubCutaneous daily  folic acid 1 milliGRAM(s) Oral daily  lactulose Syrup 20 Gram(s) Oral three times a day  melatonin 6 milliGRAM(s) Oral at bedtime  metoprolol tartrate 25 milliGRAM(s) Oral two times a day  mirtazapine 15 milliGRAM(s) Oral at bedtime  multivitamin 1 Tablet(s) Oral daily  pantoprazole    Tablet 40 milliGRAM(s) Oral before breakfast  rifAXIMin 550 milliGRAM(s) Oral two times a day  thiamine 100 milliGRAM(s) Oral daily    MEDICATIONS  (PRN):      CAPILLARY BLOOD GLUCOSE        I&O's Summary    23 Feb 2020 07:01  -  24 Feb 2020 07:00  --------------------------------------------------------  IN: 720 mL / OUT: 0 mL / NET: 720 mL    24 Feb 2020 07:01  -  24 Feb 2020 11:59  --------------------------------------------------------  IN: 240 mL / OUT: 0 mL / NET: 240 mL        PHYSICAL EXAM:  Vital Signs Last 24 Hrs  T(C): 36.6 (24 Feb 2020 03:52), Max: 36.9 (23 Feb 2020 20:54)  T(F): 97.9 (24 Feb 2020 03:52), Max: 98.5 (23 Feb 2020 20:54)  HR: 78 (24 Feb 2020 03:52) (73 - 78)  BP: 128/81 (24 Feb 2020 03:52) (104/64 - 128/81)  BP(mean): --  RR: 18 (24 Feb 2020 03:52) (18 - 18)  SpO2: 100% (24 Feb 2020 03:52) (97% - 100%)    CONSTITUTIONAL: NAD, well-developed, non toxic  RESPIRATORY: Normal respiratory effort; lungs are clear to auscultation bilaterally  CARDIOVASCULAR: Regular rate and rhythm, normal S1 and S2, no murmur/rub/gallop; No lower extremity edema; Peripheral pulses are 2+ bilaterally  ABDOMEN: Nontender to palpation, normoactive bowel sounds, no rebound/guarding; No hepatosplenomegaly  MUSCLOSKELETAL: no clubbing or cyanosis of digits; no joint swelling or tenderness to palpation  PSYCH: A+O to person, place, and time; affect appropriate    LABS:                        10.2   10.11 )-----------( 284      ( 23 Feb 2020 11:53 )             30.6     02-23    138  |  104  |  23  ----------------------------<  96  4.2   |  20<L>  |  0.74    Ca    9.3      23 Feb 2020 11:53    TPro  7.9  /  Alb  2.8<L>  /  TBili  2.3<H>  /  DBili  x   /  AST  122<H>  /  ALT  78<H>  /  AlkPhos  251<H>  02-23    PT/INR - ( 23 Feb 2020 13:20 )   PT: 17.3 sec;   INR: 1.49 ratio         PTT - ( 23 Feb 2020 13:20 )  PTT:37.3 sec            RADIOLOGY & ADDITIONAL TESTS:  Results Reviewed:   Imaging Personally Reviewed:  Electrocardiogram Personally Reviewed:    COORDINATION OF CARE:  Care Discussed with Consultants/Other Providers [Y/N]:  Prior or Outpatient Records Reviewed [Y/N]:

## 2020-02-25 PROCEDURE — 99232 SBSQ HOSP IP/OBS MODERATE 35: CPT

## 2020-02-25 RX ORDER — LANOLIN ALCOHOL/MO/W.PET/CERES
2 CREAM (GRAM) TOPICAL
Qty: 0 | Refills: 0 | DISCHARGE
Start: 2020-02-25

## 2020-02-25 RX ORDER — LACTULOSE 10 G/15ML
30 SOLUTION ORAL
Qty: 0 | Refills: 0 | DISCHARGE
Start: 2020-02-25

## 2020-02-25 RX ADMIN — MIRTAZAPINE 15 MILLIGRAM(S): 45 TABLET, ORALLY DISINTEGRATING ORAL at 22:56

## 2020-02-25 RX ADMIN — Medication 100 MILLIGRAM(S): at 13:20

## 2020-02-25 RX ADMIN — LACTULOSE 20 GRAM(S): 10 SOLUTION ORAL at 22:56

## 2020-02-25 RX ADMIN — PANTOPRAZOLE SODIUM 40 MILLIGRAM(S): 20 TABLET, DELAYED RELEASE ORAL at 06:31

## 2020-02-25 RX ADMIN — Medication 6 MILLIGRAM(S): at 22:56

## 2020-02-25 RX ADMIN — LACTULOSE 20 GRAM(S): 10 SOLUTION ORAL at 08:17

## 2020-02-25 RX ADMIN — Medication 25 MILLIGRAM(S): at 05:27

## 2020-02-25 RX ADMIN — Medication 1 TABLET(S): at 13:20

## 2020-02-25 RX ADMIN — Medication 25 MILLIGRAM(S): at 17:11

## 2020-02-25 RX ADMIN — Medication 1 MILLIGRAM(S): at 13:20

## 2020-02-25 NOTE — DISCHARGE NOTE PROVIDER - CARE PROVIDER_API CALL
Luis Shipman)  Gastroenterology; Internal Medicine  [address] [place] [address]  Phone: [phone]  Fax: [phone]  Follow Up Time:     [facility],   [address] [place] [address]  Phone: [phone]  Fax: (   )    -  Follow Up Time:

## 2020-02-25 NOTE — DISCHARGE NOTE PROVIDER - NSDCMRMEDTOKEN_GEN_ALL_CORE_FT
atenolol 50 mg oral tablet: 1 tab(s) orally once a day  esomeprazole 40 mg oral delayed release capsule: 1 cap(s) orally once a day  folic acid 1 mg oral tablet: 1 tab(s) orally once a day  lactulose 10 g/15 mL oral syrup: 30 milliliter(s) orally 3 times a day  melatonin 3 mg oral tablet: 2 tab(s) orally once a day (at bedtime)  Metoprolol Tartrate 25 mg oral tablet: 1 tab(s) orally 2 times a day  MiraLax oral powder for reconstitution: 17 gram(s) orally once a day  mirtazapine 15 mg oral tablet: 1 tab(s) orally once a day (at bedtime)  Multiple Vitamins oral tablet: 1 tab(s) orally once a day  rifAXIMin 550 mg oral tablet: 1 tab(s) orally 2 times a day  thiamine 100 mg oral tablet: 1 tab(s) orally once a day esomeprazole 40 mg oral delayed release capsule: 1 cap(s) orally once a day  folic acid 1 mg oral tablet: 1 tab(s) orally once a day  lactulose 10 g/15 mL oral syrup: 30 milliliter(s) orally 3 times a day  melatonin 3 mg oral tablet: 2 tab(s) orally once a day (at bedtime)  Metoprolol Tartrate 25 mg oral tablet: 1 tab(s) orally 2 times a day  MiraLax oral powder for reconstitution: 17 gram(s) orally once a day  mirtazapine 15 mg oral tablet: 1 tab(s) orally once a day (at bedtime)  Multiple Vitamins oral tablet: 1 tab(s) orally once a day  rifAXIMin 550 mg oral tablet: 1 tab(s) orally 2 times a day  thiamine 100 mg oral tablet: 1 tab(s) orally once a day

## 2020-02-25 NOTE — PROGRESS NOTE ADULT - ASSESSMENT
The patient is a 65 year old non-domisciled man with a history if cirrhosis secondary to alcohol and drug abuse, subdural hemorrhage, afib (not on AC) is transferred from [facility] for further work up of hepatic masses now confirmed as extensive HCC

## 2020-02-25 NOTE — PROGRESS NOTE ADULT - SUBJECTIVE AND OBJECTIVE BOX
PROGRESS NOTE:     Patient is a 65y old  Male who presents with a chief complaint of Cirrhosis with likely liver malignancy (24 Feb 2020 11:59)      SUBJECTIVE / OVERNIGHT EVENTS: GIGI    ADDITIONAL REVIEW OF SYSTEMS:  No fevers or chills   no n/v/d   MEDICATIONS  (STANDING):  enoxaparin Injectable 40 milliGRAM(s) SubCutaneous daily  folic acid 1 milliGRAM(s) Oral daily  lactulose Syrup 20 Gram(s) Oral three times a day  melatonin 6 milliGRAM(s) Oral at bedtime  metoprolol tartrate 25 milliGRAM(s) Oral two times a day  mirtazapine 15 milliGRAM(s) Oral at bedtime  multivitamin 1 Tablet(s) Oral daily  pantoprazole    Tablet 40 milliGRAM(s) Oral before breakfast  rifAXIMin 550 milliGRAM(s) Oral two times a day  thiamine 100 milliGRAM(s) Oral daily    MEDICATIONS  (PRN):      CAPILLARY BLOOD GLUCOSE        I&O's Summary    24 Feb 2020 07:01  -  25 Feb 2020 07:00  --------------------------------------------------------  IN: 1000 mL / OUT: 0 mL / NET: 1000 mL        PHYSICAL EXAM:  Vital Signs Last 24 Hrs  T(C): 36.7 (25 Feb 2020 14:07), Max: 36.9 (24 Feb 2020 21:01)  T(F): 98.1 (25 Feb 2020 14:07), Max: 98.4 (24 Feb 2020 21:01)  HR: 69 (25 Feb 2020 14:07) (65 - 85)  BP: 115/77 (25 Feb 2020 14:07) (111/65 - 120/75)  BP(mean): --  RR: 18 (25 Feb 2020 14:07) (17 - 18)  SpO2: 98% (25 Feb 2020 14:07) (97% - 100%)    CONSTITUTIONAL: NAD, well-developed  RESPIRATORY: Normal respiratory effort; lungs are clear to auscultation bilaterally  CARDIOVASCULAR: Regular rate and rhythm, normal S1 and S2, no murmur/rub/gallop; No lower extremity edema; Peripheral pulses are 2+ bilaterally  ABDOMEN: Nontender to palpation, normoactive bowel sounds, no rebound/guarding; No hepatosplenomegaly  MUSCLOSKELETAL: no clubbing or cyanosis of digits; no joint swelling or tenderness to palpation  PSYCH: A+O to person, place, and time; affect appropriate    LABS:                      RADIOLOGY & ADDITIONAL TESTS:  Results Reviewed:   Imaging Personally Reviewed:  Electrocardiogram Personally Reviewed:    COORDINATION OF CARE:  Care Discussed with Consultants/Other Providers [Y/N]:  Prior or Outpatient Records Reviewed [Y/N]:

## 2020-02-25 NOTE — PROGRESS NOTE ADULT - ATTENDING COMMENTS
Patient was seen and examined with hepatology team on rounds on 2/20/20.
Dx, Px and lack of suitable therapies for his HCC d/w pt.Will focus on QoL issues as pt is homeless.I will see him for pall care at [facility]
Patient was seen and examined with Hepatology team on rounds on 2/18/2020. Agree with above.  I personally reviewed patient's abdominal imaging with Dr. Willis which showed multifocal HCC.    Patient is not a liver transplant candidate.  Will recommend continue current care per primary team, daily MELD-Na, and IR evaluation for multifocal HCC.
Patient was seen and examined with hepatology team on rounds on 2/21/2020.
Patient was seen and examined with Hepatology team on rounds on 2/19/2020.
Wendy Nuno DO  Hospitalist  [phone]    Dispo: Pending placement ready for DC and outpt Alex follow up
Wendy Nuno DO  Hospitalist  [phone]    Pending placement, medically cleared to follow up as an outpatient.
Wendy Nuno DO  Hospitalist  [phone]    Discharge pending social work placement.  Medically cleared.
Wendy Nuno DO  [facility] Hospitalist  [phone]

## 2020-02-25 NOTE — PROGRESS NOTE ADULT - PROBLEM SELECTOR PLAN 2
-  history of cirrhosis recently found to have rising bilirubin at [facility] and abdominal pain likely component of Alcoholic Hepatitis v HCC   - extensive work-up was done at [facility] regarding the etiology. Likely multifactorial in the setting of hep b/c infections as well as alcoholic hepatitis  - appreciate hepatology c/s  - HEP C reactive Hep S Ab reactive, Hep B surface ag nonreactive, core nonreactive, Hep C RNA nonreactive, Hep A IgG reactive, Hep A IgM indeterminate  HIV nonreactive  IgA elevated, IgM normal, Ceruloplasmin 39, SPEP migrating B protein with M spike 1.2

## 2020-02-25 NOTE — PROGRESS NOTE ADULT - PROBLEM SELECTOR PLAN 1
newly diagnosed HCC, IR deemed not a candidate for local therapy and oncology consults pending for therapeutic options  - Ct chest 2/19 showing no intrapulmonary mets  MRI liver 2.18 -->Extensive bilobar hepatocellular neoplasm including infiltrative disease in segment 8, greater than 10 lesions in the right lobe and several lesions in the left hepatic lobe reference lesions as follows  CT abdomen : numerous hepatic masses. Largest 5.5x 10.9 right lobe.   [place] [facility] 2/11 triple phase: cirrhotic liver with multiple masses suspicious for hepatocellular carcinoma  - AFP 36.8  - CEA 6.4  -NM bone scan with no evidence of osseous metastasis,  - as per heme onc, role of chemotherapy would be palliative, will follow up final plan and re: M spike--> IgA spike ?MGUS, serum free light chains

## 2020-02-25 NOTE — DISCHARGE NOTE PROVIDER - NSDCCPCAREPLAN_GEN_ALL_CORE_FT
PRINCIPAL DISCHARGE DIAGNOSIS  Diagnosis: Cirrhosis of liver without ascites, unspecified hepatic cirrhosis type  Assessment and Plan of Treatment: Cirrhosis of liver without ascites, unspecified hepatic cirrhosis type  extensive work up done  can follow up in the hepatology clinic      SECONDARY DISCHARGE DIAGNOSES  Diagnosis: Paroxysmal atrial fibrillation  Assessment and Plan of Treatment: Paroxysmal atrial fibrillation  not on anticoaguolation  continue metoprolol    Diagnosis: Hepatocellular carcinoma  Assessment and Plan of Treatment: Hepatocellular carcinoma  role of chemo would be palliative  follow up at the [facility]    Diagnosis: Alcohol abuse  Assessment and Plan of Treatment: Alcohol abuse  cessation of alcohol    Diagnosis: Essential hypertension  Assessment and Plan of Treatment: Essential hypertension  Follow up with your medical doctor to establish long term blood pressure treatment goals.

## 2020-02-25 NOTE — DISCHARGE NOTE PROVIDER - PROVIDER TOKENS
PROVIDER:[TOKEN:[[id number]]],FREE:[LAST:[[facility]],PHONE:[phone]],FAX:[(   )    -],ADDRESS:[[address] [place] [address]]]

## 2020-02-25 NOTE — PROGRESS NOTE ADULT - PROBLEM SELECTOR PLAN 8
DVT prophylaxis: lovenox (SDH was year ago, CT from [facility] from January is stable).    Social work consult--> non- domiciled

## 2020-02-25 NOTE — DISCHARGE NOTE PROVIDER - HOSPITAL COURSE
The patient is a 65 year old non-domisciled man with a history if cirrhosis secondary to alcohol and drug abuse, subdural hemorrhage, afib (not on AC) is transferred from [facility] for further work up of hepatic masses. The patient was admitted to [facility] with diffuse abdominal pain and tremulousness. He had a sepsis work-up which was negative and completed a CIWA ativan taper. He had a repeat CT A/P with IV contrast which redemonstrated multiple liver lesions. He had an extensive work-up for his cirrhosis and liver lesions in 05/2019 at [facility] and refused biopsy at the time but agreed to biopsy during this hospitalization. They attempted CT guided liver biopsy however the patient was moving around too much and the procedure was aborted. The patient was transferred to [facility] for further hepatology work up.     - The patient has a history of cirrhosis without current encephalopathy. recently found to have rising bilirubin at [facility].     - extensive work-up was done at [facility] regarding the etiology. likely multifactorial in the setting of hep b/c infections as well as alcohol abuse.     MRI liver 2.18 -->Extensive bilobar hepatocellular neoplasm including infiltrative disease in segment 8, greater than 10 lesions in the right lobe and several lesions in the left hepatic lobe reference lesions as follows    CT abdomen : numerous hepatic masses. Largest 5.5x 10.9 right lobe.     [place] [facility]11 triple phase: cirrhotic liver with multiple masses suspicious for hepatocellular carcinoma    - AFP 36.8    - CEA 6.4    -NM bone scan with no evidence of osseous metastasis,    - as per heme onc, role of chemotherapy would be palliative, will follow up final plan and re: M spike--> IgA spike ?MGUS, serum free light chains.     This case was discussed with Dr. Nuno and he has been cleared for discharge. The patient is a 65 year old non-domisciled man with a history if cirrhosis secondary to alcohol and drug abuse, subdural hemorrhage, afib (not on AC) is transferred from [facility] for further work up of hepatic masses. The patient was admitted to [facility] with diffuse abdominal pain and tremulousness. He had a sepsis work-up which was negative and completed a CIWA ativan taper. He had a repeat CT A/P with IV contrast which redemonstrated multiple liver lesions. He had an extensive work-up for his cirrhosis and liver lesions in 05/2019 at [facility] and refused biopsy at the time but agreed to biopsy during this hospitalization. They attempted CT guided liver biopsy however the patient was moving around too much and the procedure was aborted. The patient was transferred to [facility] for further hepatology work up.     - The patient has a history of cirrhosis without current encephalopathy. recently found to have rising bilirubin at [facility].     - extensive work-up was done at [facility] regarding the etiology. likely multifactorial in the setting of hep b/c infections as well as alcohol abuse.     MRI liver 2.18 -->Extensive bilobar hepatocellular neoplasm including infiltrative disease in segment 8, greater than 10 lesions in the right lobe and several lesions in the left hepatic lobe reference lesions as follows    CT abdomen : numerous hepatic masses. Largest 5.5x 10.9 right lobe.     [place] [facility]11 triple phase: cirrhotic liver with multiple masses 2/2 hepatocellular carcinoma    - AFP 36.8    - CEA 6.4    -NM bone scan with no evidence of osseous metastasis,    - as per heme onc, role of chemotherapy would be palliative, will follow up as outpatient     This case was discussed with Dr. Nuno and he has been cleared for discharge.

## 2020-02-25 NOTE — DISCHARGE NOTE PROVIDER - NSDCFUADDAPPT_GEN_ALL_CORE_FT
Follow up with the [facility] within one week of discharge - please call to make this appointment.    Follow up with your primary medical doctor and your hepatologist within 1-2 weeks of discharge - please call to make these appointments.

## 2020-02-25 NOTE — DISCHARGE NOTE PROVIDER - NSDCPNSUBOBJ_GEN_ALL_CORE
Pt admitted found to have HCC and needs palliative chemotherapy as an outpatient.  He will DC today with outpatient follow up at [facility]. Pt admitted found to have HCC and needs palliative chemotherapy as an outpatient.  He will DC today with outpatient follow up at [facility].  Discharge time 39 minutes

## 2020-02-26 VITALS
TEMPERATURE: 98 F | SYSTOLIC BLOOD PRESSURE: 117 MMHG | HEART RATE: 74 BPM | RESPIRATION RATE: 18 BRPM | OXYGEN SATURATION: 98 % | DIASTOLIC BLOOD PRESSURE: 77 MMHG

## 2020-02-26 PROCEDURE — 82103 ALPHA-1-ANTITRYPSIN TOTAL: CPT

## 2020-02-26 PROCEDURE — 93005 ELECTROCARDIOGRAM TRACING: CPT

## 2020-02-26 PROCEDURE — 97116 GAIT TRAINING THERAPY: CPT

## 2020-02-26 PROCEDURE — 83735 ASSAY OF MAGNESIUM: CPT

## 2020-02-26 PROCEDURE — 87389 HIV-1 AG W/HIV-1&-2 AB AG IA: CPT

## 2020-02-26 PROCEDURE — 85730 THROMBOPLASTIN TIME PARTIAL: CPT

## 2020-02-26 PROCEDURE — 82746 ASSAY OF FOLIC ACID SERUM: CPT

## 2020-02-26 PROCEDURE — 84165 PROTEIN E-PHORESIS SERUM: CPT

## 2020-02-26 PROCEDURE — 83550 IRON BINDING TEST: CPT

## 2020-02-26 PROCEDURE — 83615 LACTATE (LD) (LDH) ENZYME: CPT

## 2020-02-26 PROCEDURE — 86709 HEPATITIS A IGM ANTIBODY: CPT

## 2020-02-26 PROCEDURE — 84443 ASSAY THYROID STIM HORMONE: CPT

## 2020-02-26 PROCEDURE — 82248 BILIRUBIN DIRECT: CPT

## 2020-02-26 PROCEDURE — 82105 ALPHA-FETOPROTEIN SERUM: CPT

## 2020-02-26 PROCEDURE — 87086 URINE CULTURE/COLONY COUNT: CPT

## 2020-02-26 PROCEDURE — 86381 MITOCHONDRIAL ANTIBODY EACH: CPT

## 2020-02-26 PROCEDURE — 71260 CT THORAX DX C+: CPT

## 2020-02-26 PROCEDURE — 78306 BONE IMAGING WHOLE BODY: CPT

## 2020-02-26 PROCEDURE — 74183 MRI ABD W/O CNTR FLWD CNTR: CPT

## 2020-02-26 PROCEDURE — 82607 VITAMIN B-12: CPT

## 2020-02-26 PROCEDURE — 87340 HEPATITIS B SURFACE AG IA: CPT

## 2020-02-26 PROCEDURE — 83540 ASSAY OF IRON: CPT

## 2020-02-26 PROCEDURE — 86706 HEP B SURFACE ANTIBODY: CPT

## 2020-02-26 PROCEDURE — A9585: CPT

## 2020-02-26 PROCEDURE — 97530 THERAPEUTIC ACTIVITIES: CPT

## 2020-02-26 PROCEDURE — A9561: CPT

## 2020-02-26 PROCEDURE — 86038 ANTINUCLEAR ANTIBODIES: CPT

## 2020-02-26 PROCEDURE — 82378 CARCINOEMBRYONIC ANTIGEN: CPT

## 2020-02-26 PROCEDURE — 86704 HEP B CORE ANTIBODY TOTAL: CPT

## 2020-02-26 PROCEDURE — 87521 HEPATITIS C PROBE&RVRS TRNSC: CPT

## 2020-02-26 PROCEDURE — 83521 IG LIGHT CHAINS FREE EACH: CPT

## 2020-02-26 PROCEDURE — 82784 ASSAY IGA/IGD/IGG/IGM EACH: CPT

## 2020-02-26 PROCEDURE — 86803 HEPATITIS C AB TEST: CPT

## 2020-02-26 PROCEDURE — 84100 ASSAY OF PHOSPHORUS: CPT

## 2020-02-26 PROCEDURE — 86334 IMMUNOFIX E-PHORESIS SERUM: CPT

## 2020-02-26 PROCEDURE — 99239 HOSP IP/OBS DSCHRG MGMT >30: CPT

## 2020-02-26 PROCEDURE — 86708 HEPATITIS A ANTIBODY: CPT

## 2020-02-26 PROCEDURE — 85027 COMPLETE CBC AUTOMATED: CPT

## 2020-02-26 PROCEDURE — 84155 ASSAY OF PROTEIN SERUM: CPT

## 2020-02-26 PROCEDURE — 81001 URINALYSIS AUTO W/SCOPE: CPT

## 2020-02-26 PROCEDURE — 80307 DRUG TEST PRSMV CHEM ANLYZR: CPT

## 2020-02-26 PROCEDURE — 85610 PROTHROMBIN TIME: CPT

## 2020-02-26 PROCEDURE — 80061 LIPID PANEL: CPT

## 2020-02-26 PROCEDURE — 86255 FLUORESCENT ANTIBODY SCREEN: CPT

## 2020-02-26 PROCEDURE — 82390 ASSAY OF CERULOPLASMIN: CPT

## 2020-02-26 PROCEDURE — 87040 BLOOD CULTURE FOR BACTERIA: CPT

## 2020-02-26 PROCEDURE — 82985 ASSAY OF GLYCATED PROTEIN: CPT

## 2020-02-26 PROCEDURE — 82140 ASSAY OF AMMONIA: CPT

## 2020-02-26 PROCEDURE — 80053 COMPREHEN METABOLIC PANEL: CPT

## 2020-02-26 PROCEDURE — 87517 HEPATITIS B DNA QUANT: CPT

## 2020-02-26 PROCEDURE — 97162 PT EVAL MOD COMPLEX 30 MIN: CPT

## 2020-02-26 PROCEDURE — 82728 ASSAY OF FERRITIN: CPT

## 2020-02-26 RX ORDER — LACTULOSE 10 G/15ML
30 SOLUTION ORAL
Qty: 2700 | Refills: 0
Start: 2020-02-26 | End: 2020-03-26

## 2020-02-26 RX ORDER — ATENOLOL 25 MG/1
1 TABLET ORAL
Qty: 0 | Refills: 0 | DISCHARGE

## 2020-02-26 RX ADMIN — Medication 1 MILLIGRAM(S): at 12:18

## 2020-02-26 RX ADMIN — LACTULOSE 20 GRAM(S): 10 SOLUTION ORAL at 13:08

## 2020-02-26 RX ADMIN — PANTOPRAZOLE SODIUM 40 MILLIGRAM(S): 20 TABLET, DELAYED RELEASE ORAL at 05:46

## 2020-02-26 RX ADMIN — Medication 100 MILLIGRAM(S): at 12:18

## 2020-02-26 RX ADMIN — Medication 25 MILLIGRAM(S): at 05:46

## 2020-02-26 RX ADMIN — Medication 1 TABLET(S): at 12:18

## 2020-02-26 NOTE — CHART NOTE - NSCHARTNOTEFT_GEN_A_CORE
Request from Dr. Nuno to facilitate patient discharge.  Medication reconciliation reviewed, revised, and resolved with Dr. Nuno, who has medically cleared patient for discharge with follow up as advised.  Please refer to discharge note for detailed hospital course.

## 2020-04-01 PROCEDURE — G9005: CPT

## 2022-05-17 NOTE — PHYSICAL THERAPY INITIAL EVALUATION ADULT - WORK/LEISURE ACTIVITY, REHAB EVAL
Paz Ruiz is calling to request a refill on the following medication(s):    Medication Request:  Requested Prescriptions     Pending Prescriptions Disp Refills    olmesartan (BENICAR) 40 MG tablet [Pharmacy Med Name: OLMESARTAN MEDOXOMIL 40 MG TAB] 30 tablet      Sig: TAKE ONE TABLET BY MOUTH DAILY    QUEtiapine (SEROQUEL XR) 50 MG extended release tablet [Pharmacy Med Name: QUEtiapine ER 50 MG TABLET] 30 tablet      Sig: TAKE ONE TABLET BY MOUTH ONCE NIGHTLY       Last Visit Date (If Applicable):  0/35/1701    Next Visit Date:    7/18/2022
independent

## 2024-02-13 NOTE — PROGRESS NOTE ADULT - PROBLEM/PLAN-8
Assumed care of this pt at this time   Patient identifiers verified and correct for Jaki Bajwa   LOC: The patient is awake, alert and aware of environment with an appropriate affect, the patient is oriented x 3 and speaking appropriately.   APPEARANCE: Patient appears comfortable and in no acute distress, patient is clean and well groomed.  SKIN: The skin is warm and dry, color consistent with ethnicity, patient has normal skin turgor and moist mucus membranes, skin intact, no breakdown or bruising noted.   MUSCULOSKELETAL: Patient moving all extremities spontaneously  RESPIRATORY: Airway is open and patent, respirations are spontaneous, patient has a normal effort and rate, no accessory muscle use noted, O2 sats noted at 97% on room air.  CARDIAC: Pt placed on cardiac monitor. Patient has a tachy rate and regular rhythm, capillary refill < 3 seconds.   GASTRO: Soft and non tender to palpation, no distention noted.  Pt states bowel movements have been regular.   : Pt denies any pain or frequency with urination. Pt c/o right flank pain radiating to back   NEURO: Pt opens eyes spontaneously, behavior appropriate to situation, follows commands, facial expression symmetrical, bilateral hand grasp equal and even, purposeful motor response noted, normal sensation in all extremities when touched with a finger.   DISPLAY PLAN FREE TEXT